# Patient Record
Sex: MALE | Race: WHITE | Employment: FULL TIME | URBAN - METROPOLITAN AREA
[De-identification: names, ages, dates, MRNs, and addresses within clinical notes are randomized per-mention and may not be internally consistent; named-entity substitution may affect disease eponyms.]

---

## 2018-05-19 ENCOUNTER — OFFICE VISIT (OUTPATIENT)
Dept: FAMILY MEDICINE CLINIC | Age: 33
End: 2018-05-19
Payer: COMMERCIAL

## 2018-05-19 VITALS
SYSTOLIC BLOOD PRESSURE: 142 MMHG | WEIGHT: 315 LBS | HEIGHT: 67 IN | HEART RATE: 79 BPM | OXYGEN SATURATION: 96 % | RESPIRATION RATE: 18 BRPM | DIASTOLIC BLOOD PRESSURE: 98 MMHG | BODY MASS INDEX: 49.44 KG/M2

## 2018-05-19 DIAGNOSIS — I10 ESSENTIAL HYPERTENSION: ICD-10-CM

## 2018-05-19 DIAGNOSIS — M77.01 MEDIAL EPICONDYLITIS OF RIGHT ELBOW: Primary | ICD-10-CM

## 2018-05-19 DIAGNOSIS — E78.00 PURE HYPERCHOLESTEROLEMIA: ICD-10-CM

## 2018-05-19 PROCEDURE — 99203 OFFICE O/P NEW LOW 30 MIN: CPT | Performed by: FAMILY MEDICINE

## 2018-05-19 RX ORDER — IBUPROFEN 800 MG/1
800 TABLET ORAL EVERY 6 HOURS PRN
Qty: 120 TABLET | Refills: 3 | Status: SHIPPED | OUTPATIENT
Start: 2018-05-19 | End: 2018-05-30 | Stop reason: ALTCHOICE

## 2018-05-19 RX ORDER — HYDROCHLOROTHIAZIDE 12.5 MG/1
12.5 TABLET ORAL DAILY
Qty: 90 TABLET | Refills: 1 | Status: SHIPPED | OUTPATIENT
Start: 2018-05-19 | End: 2018-06-16 | Stop reason: SDUPTHER

## 2018-05-19 ASSESSMENT — ENCOUNTER SYMPTOMS
ANAL BLEEDING: 0
SORE THROAT: 0
NAUSEA: 0
COUGH: 0
DIARRHEA: 0
BACK PAIN: 0
CONSTIPATION: 0
BLOOD IN STOOL: 0
EYE ITCHING: 0
ABDOMINAL PAIN: 0
COLOR CHANGE: 0
WHEEZING: 0
EYE PAIN: 0
SHORTNESS OF BREATH: 1
VOMITING: 0

## 2018-05-19 ASSESSMENT — PATIENT HEALTH QUESTIONNAIRE - PHQ9
SUM OF ALL RESPONSES TO PHQ QUESTIONS 1-9: 0
1. LITTLE INTEREST OR PLEASURE IN DOING THINGS: 0
2. FEELING DOWN, DEPRESSED OR HOPELESS: 0
SUM OF ALL RESPONSES TO PHQ9 QUESTIONS 1 & 2: 0

## 2018-05-23 ENCOUNTER — TELEPHONE (OUTPATIENT)
Dept: FAMILY MEDICINE CLINIC | Age: 33
End: 2018-05-23

## 2018-05-23 DIAGNOSIS — M77.01 GOLFERS ELBOW OF RIGHT UPPER EXTREMITY: Primary | ICD-10-CM

## 2018-05-29 ENCOUNTER — HOSPITAL ENCOUNTER (OUTPATIENT)
Age: 33
Discharge: HOME | End: 2018-05-31
Payer: COMMERCIAL

## 2018-05-29 DIAGNOSIS — E78.00 PURE HYPERCHOLESTEROLEMIA: ICD-10-CM

## 2018-05-29 DIAGNOSIS — M25.521 RIGHT ELBOW PAIN: Primary | ICD-10-CM

## 2018-05-29 DIAGNOSIS — I10 ESSENTIAL HYPERTENSION: ICD-10-CM

## 2018-05-29 LAB
ALBUMIN SERPL-MCNC: 3.9 G/DL (ref 3.5–5.2)
ALP BLD-CCNC: 67 U/L (ref 40–129)
ALT SERPL-CCNC: 23 U/L (ref 0–40)
ANION GAP SERPL CALCULATED.3IONS-SCNC: 14 MMOL/L (ref 7–16)
AST SERPL-CCNC: 21 U/L (ref 0–39)
BILIRUB SERPL-MCNC: 0.5 MG/DL (ref 0–1.2)
BUN BLDV-MCNC: 8 MG/DL (ref 6–20)
CALCIUM SERPL-MCNC: 9.1 MG/DL (ref 8.6–10.2)
CHLORIDE BLD-SCNC: 100 MMOL/L (ref 98–107)
CHOLESTEROL, TOTAL: 230 MG/DL (ref 0–199)
CO2: 25 MMOL/L (ref 22–29)
CREAT SERPL-MCNC: 0.7 MG/DL (ref 0.7–1.2)
GFR AFRICAN AMERICAN: >60
GFR NON-AFRICAN AMERICAN: >60 ML/MIN/1.73
GLUCOSE BLD-MCNC: 97 MG/DL (ref 74–109)
HCT VFR BLD CALC: 50.2 % (ref 37–54)
HDLC SERPL-MCNC: 41 MG/DL
HEMOGLOBIN: 15.3 G/DL (ref 12.5–16.5)
LDL CHOLESTEROL CALCULATED: 152 MG/DL (ref 0–99)
MCH RBC QN AUTO: 26 PG (ref 26–35)
MCHC RBC AUTO-ENTMCNC: 30.5 % (ref 32–34.5)
MCV RBC AUTO: 85.2 FL (ref 80–99.9)
PDW BLD-RTO: 13.8 FL (ref 11.5–15)
PLATELET # BLD: 327 E9/L (ref 130–450)
PMV BLD AUTO: 9.9 FL (ref 7–12)
POTASSIUM SERPL-SCNC: 4.3 MMOL/L (ref 3.5–5)
RBC # BLD: 5.89 E12/L (ref 3.8–5.8)
SODIUM BLD-SCNC: 139 MMOL/L (ref 132–146)
TOTAL PROTEIN: 7.3 G/DL (ref 6.4–8.3)
TRIGL SERPL-MCNC: 185 MG/DL (ref 0–149)
VLDLC SERPL CALC-MCNC: 37 MG/DL
WBC # BLD: 6.7 E9/L (ref 4.5–11.5)

## 2018-05-29 PROCEDURE — 36415 COLL VENOUS BLD VENIPUNCTURE: CPT

## 2018-05-29 PROCEDURE — 80053 COMPREHEN METABOLIC PANEL: CPT

## 2018-05-29 PROCEDURE — 80061 LIPID PANEL: CPT

## 2018-05-29 PROCEDURE — 85027 COMPLETE CBC AUTOMATED: CPT

## 2018-05-30 ENCOUNTER — OFFICE VISIT (OUTPATIENT)
Dept: ORTHOPEDIC SURGERY | Age: 33
End: 2018-05-30
Payer: COMMERCIAL

## 2018-05-30 VITALS — WEIGHT: 315 LBS | HEIGHT: 67 IN | BODY MASS INDEX: 49.44 KG/M2

## 2018-05-30 DIAGNOSIS — M77.01 MEDIAL EPICONDYLITIS OF RIGHT ELBOW: Primary | ICD-10-CM

## 2018-05-30 PROCEDURE — 99203 OFFICE O/P NEW LOW 30 MIN: CPT | Performed by: ORTHOPAEDIC SURGERY

## 2018-05-30 RX ORDER — MELOXICAM 15 MG/1
15 TABLET ORAL DAILY
Qty: 30 TABLET | Refills: 2 | Status: SHIPPED | OUTPATIENT
Start: 2018-05-30 | End: 2019-03-23

## 2018-06-16 ENCOUNTER — OFFICE VISIT (OUTPATIENT)
Dept: FAMILY MEDICINE CLINIC | Age: 33
End: 2018-06-16
Payer: COMMERCIAL

## 2018-06-16 VITALS
WEIGHT: 315 LBS | HEART RATE: 88 BPM | HEIGHT: 67 IN | BODY MASS INDEX: 49.44 KG/M2 | OXYGEN SATURATION: 95 % | DIASTOLIC BLOOD PRESSURE: 90 MMHG | RESPIRATION RATE: 18 BRPM | SYSTOLIC BLOOD PRESSURE: 140 MMHG

## 2018-06-16 DIAGNOSIS — I10 ESSENTIAL HYPERTENSION: ICD-10-CM

## 2018-06-16 DIAGNOSIS — R06.83 SNORING: Primary | ICD-10-CM

## 2018-06-16 DIAGNOSIS — Z23 NEED FOR TDAP VACCINATION: ICD-10-CM

## 2018-06-16 PROCEDURE — 90715 TDAP VACCINE 7 YRS/> IM: CPT | Performed by: FAMILY MEDICINE

## 2018-06-16 PROCEDURE — 99213 OFFICE O/P EST LOW 20 MIN: CPT | Performed by: FAMILY MEDICINE

## 2018-06-16 PROCEDURE — 90471 IMMUNIZATION ADMIN: CPT | Performed by: FAMILY MEDICINE

## 2018-06-16 RX ORDER — HYDROCHLOROTHIAZIDE 25 MG/1
25 TABLET ORAL DAILY
Qty: 30 TABLET | Refills: 3 | Status: SHIPPED | OUTPATIENT
Start: 2018-06-16 | End: 2018-11-10 | Stop reason: SDUPTHER

## 2018-06-16 ASSESSMENT — ENCOUNTER SYMPTOMS
WHEEZING: 0
VOMITING: 0
CONSTIPATION: 0
SHORTNESS OF BREATH: 0
COUGH: 0
ABDOMINAL PAIN: 0
DIARRHEA: 0
NAUSEA: 0

## 2018-07-16 ENCOUNTER — HOSPITAL ENCOUNTER (OUTPATIENT)
Age: 33
Discharge: HOME | End: 2018-07-18
Payer: COMMERCIAL

## 2018-07-16 DIAGNOSIS — I10 ESSENTIAL HYPERTENSION: ICD-10-CM

## 2018-07-16 PROCEDURE — 36415 COLL VENOUS BLD VENIPUNCTURE: CPT

## 2018-07-16 PROCEDURE — 80048 BASIC METABOLIC PNL TOTAL CA: CPT

## 2018-07-17 LAB
ANION GAP SERPL CALCULATED.3IONS-SCNC: 18 MMOL/L (ref 7–16)
BUN BLDV-MCNC: 12 MG/DL (ref 6–20)
CALCIUM SERPL-MCNC: 9.4 MG/DL (ref 8.6–10.2)
CHLORIDE BLD-SCNC: 99 MMOL/L (ref 98–107)
CO2: 24 MMOL/L (ref 22–29)
CREAT SERPL-MCNC: 0.6 MG/DL (ref 0.7–1.2)
GFR AFRICAN AMERICAN: >60
GFR NON-AFRICAN AMERICAN: >60 ML/MIN/1.73
GLUCOSE BLD-MCNC: 112 MG/DL (ref 74–109)
POTASSIUM SERPL-SCNC: 4 MMOL/L (ref 3.5–5)
SODIUM BLD-SCNC: 141 MMOL/L (ref 132–146)

## 2018-07-21 ENCOUNTER — OFFICE VISIT (OUTPATIENT)
Dept: FAMILY MEDICINE CLINIC | Age: 33
End: 2018-07-21
Payer: COMMERCIAL

## 2018-07-21 VITALS
OXYGEN SATURATION: 96 % | RESPIRATION RATE: 18 BRPM | HEART RATE: 89 BPM | DIASTOLIC BLOOD PRESSURE: 88 MMHG | BODY MASS INDEX: 49.44 KG/M2 | WEIGHT: 315 LBS | HEIGHT: 67 IN | SYSTOLIC BLOOD PRESSURE: 136 MMHG

## 2018-07-21 DIAGNOSIS — Z72.0 TOBACCO ABUSE: ICD-10-CM

## 2018-07-21 DIAGNOSIS — I10 ESSENTIAL HYPERTENSION: Primary | ICD-10-CM

## 2018-07-21 DIAGNOSIS — L91.8 SKIN TAGS, MULTIPLE ACQUIRED: ICD-10-CM

## 2018-07-21 PROCEDURE — 99213 OFFICE O/P EST LOW 20 MIN: CPT | Performed by: FAMILY MEDICINE

## 2018-07-21 RX ORDER — LISINOPRIL 10 MG/1
10 TABLET ORAL DAILY
Qty: 30 TABLET | Refills: 3 | Status: SHIPPED | OUTPATIENT
Start: 2018-07-21 | End: 2018-11-10 | Stop reason: SDUPTHER

## 2018-07-21 ASSESSMENT — PATIENT HEALTH QUESTIONNAIRE - PHQ9
2. FEELING DOWN, DEPRESSED OR HOPELESS: 0
SUM OF ALL RESPONSES TO PHQ9 QUESTIONS 1 & 2: 0
1. LITTLE INTEREST OR PLEASURE IN DOING THINGS: 0
SUM OF ALL RESPONSES TO PHQ QUESTIONS 1-9: 0

## 2018-07-21 NOTE — PROGRESS NOTES
[facility]   [phone]   Soraya Benjamin MD     Patient: Caprice Cohn  YOB: 1985  Visit Date: 7/21/18    Kayla Longo is a 27y.o. year old male here today for   Chief Complaint   Patient presents with    Hypertension     4 wk f/u     Skin Tag       HPI  Patient is a 27year old male here to follow up blood pressure. Is not having any issues with medicines. Denies any change in eating habits. Does not salt food and tries to avoid salty foods. Denies any chest pain, sob, headache, lightheadedness. Is still smoking. Would like help quitting. Review of Systems   Constitutional: Negative for chills and fever. Respiratory: Negative for cough, shortness of breath and wheezing. Cardiovascular: Negative for chest pain, palpitations and leg swelling. Gastrointestinal: Negative for abdominal pain, constipation, diarrhea, nausea and vomiting. Current Outpatient Prescriptions on File Prior to Visit   Medication Sig Dispense Refill    hydrochlorothiazide (HYDRODIURIL) 25 MG tablet Take 1 tablet by mouth daily 30 tablet 3    meloxicam (MOBIC) 15 MG tablet Take 1 tablet by mouth daily Take with food. 30 tablet 2    diclofenac sodium (VOLTAREN) 1 % GEL Apply 4 g topically 4 times daily 5 Tube 3     No current facility-administered medications on file prior to visit. No Known Allergies    Past medical, surgical, social and/or family history reviewed, updated as needed, and are non-contributory (unless otherwise stated). Medications, allergies, and problem list also reviewed and updated as needed in patient's record.     Wt Readings from Last 3 Encounters:   07/21/18 (!) 406 lb (184.2 kg)   06/16/18 (!) 404 lb (183.3 kg)   05/30/18 (!) 400 lb (181.4 kg)                   /88 (Site: Left Arm, Position: Sitting)   Pulse 89   Resp 18   Ht 5' 7\" (1.702 m)   Wt (!) 406 lb (184.2 kg)   SpO2 96%   BMI 63.59 kg/m² Physical Exam   Constitutional: He appears well-developed and well-nourished. HENT:   Head: Normocephalic and atraumatic. Cardiovascular: Normal rate, regular rhythm, normal heart sounds and intact distal pulses. No murmur heard. Pulmonary/Chest: Effort normal and breath sounds normal. No respiratory distress. He has no wheezes. He has no rales. Abdominal: Soft. Bowel sounds are normal. He exhibits no distension. There is no tenderness. There is no rebound and no guarding. Musculoskeletal: Normal range of motion. He exhibits no edema or tenderness. Nursing note and vitals reviewed. Results for orders placed or performed during the hospital encounter of 94/46/27   Basic Metabolic Panel   Result Value Ref Range    Sodium 141 132 - 146 mmol/L    Potassium 4.0 3.5 - 5.0 mmol/L    Chloride 99 98 - 107 mmol/L    CO2 24 22 - 29 mmol/L    Anion Gap 18 (H) 7 - 16 mmol/L    Glucose 112 (H) 74 - 109 mg/dL    BUN 12 6 - 20 mg/dL    CREATININE 0.6 (L) 0.7 - 1.2 mg/dL    GFR Non-African American >60 >=60 mL/min/1.73    GFR African American >60     Calcium 9.4 8.6 - 10.2 mg/dL       ASSESSMENT/PLAN  Donn Martínez was seen today for hypertension and skin tag. Diagnoses and all orders for this visit:    Essential hypertension  -     lisinopril (PRINIVIL;ZESTRIL) 10 MG tablet; Take 1 tablet by mouth daily  -     Basic Metabolic Panel; Future    Skin tags, multiple acquired  [facility] & [facility]- Sommer Morrow MD (AGNES)    Tobacco abuse  -     nicotine polacrilex (NICORETTE) 4 MG gum; Take 1 each by mouth as needed for Smoking cessation Max 24 pieces per day. Phone/MyChart follow up if tests abnormal.    Return in about 4 weeks (around 8/18/2018) for hypertension. or sooner if necessary. I have reviewed my findings and recommendations with Aletha Butler M.D

## 2018-07-21 NOTE — PATIENT INSTRUCTIONS
Come in for a bmp in 2 weeks   Start the lisinopril daily   Continue the HCTZ   Try to limit your salt intake as much as possible. The dermatologist will call to schedule a visit with you   Start the nicotine gum , you can change pieces every hour . Follow up in 4 weeks or sooner with concerns.

## 2018-07-24 PROBLEM — L91.8 SKIN TAGS, MULTIPLE ACQUIRED: Status: ACTIVE | Noted: 2018-07-24

## 2018-07-24 PROBLEM — Z72.0 TOBACCO ABUSE: Status: ACTIVE | Noted: 2018-07-24

## 2018-07-24 PROBLEM — I10 ESSENTIAL HYPERTENSION: Status: ACTIVE | Noted: 2018-07-24

## 2018-07-24 ASSESSMENT — ENCOUNTER SYMPTOMS
DIARRHEA: 0
WHEEZING: 0
ABDOMINAL PAIN: 0
COUGH: 0
CONSTIPATION: 0
SHORTNESS OF BREATH: 0
NAUSEA: 0
VOMITING: 0

## 2018-08-02 ENCOUNTER — HOSPITAL ENCOUNTER (OUTPATIENT)
Dept: SLEEP CENTER | Age: 33
Discharge: HOME | End: 2018-08-02
Payer: COMMERCIAL

## 2018-08-02 PROCEDURE — 95806 SLEEP STUDY UNATT&RESP EFFT: CPT

## 2018-08-13 NOTE — PROGRESS NOTES
[address] [place]                                SLEEP STUDY REPORT    PATIENT NAME: Nhung Oneal                 :        1985  MED REC NO:   [id number]                            ROOM:  ACCOUNT NO:   [de-identified]                           ADMIT DATE: 2018  PROVIDER:     Evin Salguero MD    DATE OF STUDY:  2018    APNEALINK STUDY    ATTENDING PHYSICIAN:  Dusty Herndon MD    Recording, 10 hours 18 minutes. Evaluation, 10 hours 6 minutes. Weight 400 pounds, BMI of 62. AHI of 19.5, RI of 20, ANGELA 19. CONCLUSION:  This test is consistent with moderate obstructive sleep apnea. With these findings and with the clinical presentation and morbid obesity,  I will strongly recommend the polysomnography with CPAP titration. In  addition, of course weight control is essential and avoidance of sedatives,  narcotics, hypnotics, or any other substance that could worsen the apnea is  recommended.         Sophie Mari MD    D: 08/10/2018 15:09:26       T: 2018 5:22:42     [email or web address]  Job#: [id number]     Doc#: [id number]    CC:

## 2018-08-18 ENCOUNTER — HOSPITAL ENCOUNTER (OUTPATIENT)
Age: 33
Discharge: HOME | End: 2018-08-20
Payer: COMMERCIAL

## 2018-08-18 ENCOUNTER — OFFICE VISIT (OUTPATIENT)
Dept: FAMILY MEDICINE CLINIC | Age: 33
End: 2018-08-18
Payer: COMMERCIAL

## 2018-08-18 VITALS
DIASTOLIC BLOOD PRESSURE: 82 MMHG | RESPIRATION RATE: 18 BRPM | BODY MASS INDEX: 49.44 KG/M2 | HEART RATE: 79 BPM | SYSTOLIC BLOOD PRESSURE: 124 MMHG | OXYGEN SATURATION: 98 % | WEIGHT: 315 LBS | HEIGHT: 67 IN

## 2018-08-18 DIAGNOSIS — I10 ESSENTIAL HYPERTENSION: ICD-10-CM

## 2018-08-18 DIAGNOSIS — G47.33 OSA (OBSTRUCTIVE SLEEP APNEA): ICD-10-CM

## 2018-08-18 DIAGNOSIS — I10 ESSENTIAL HYPERTENSION: Primary | ICD-10-CM

## 2018-08-18 DIAGNOSIS — Z72.0 TOBACCO ABUSE: ICD-10-CM

## 2018-08-18 LAB
ANION GAP SERPL CALCULATED.3IONS-SCNC: 11 MMOL/L (ref 7–16)
BUN BLDV-MCNC: 12 MG/DL (ref 6–20)
CALCIUM SERPL-MCNC: 9.4 MG/DL (ref 8.6–10.2)
CHLORIDE BLD-SCNC: 101 MMOL/L (ref 98–107)
CO2: 28 MMOL/L (ref 22–29)
CREAT SERPL-MCNC: 0.7 MG/DL (ref 0.7–1.2)
GFR AFRICAN AMERICAN: >60
GFR NON-AFRICAN AMERICAN: >60 ML/MIN/1.73
GLUCOSE BLD-MCNC: 94 MG/DL (ref 74–109)
POTASSIUM SERPL-SCNC: 4.5 MMOL/L (ref 3.5–5)
SODIUM BLD-SCNC: 140 MMOL/L (ref 132–146)

## 2018-08-18 PROCEDURE — 99214 OFFICE O/P EST MOD 30 MIN: CPT | Performed by: FAMILY MEDICINE

## 2018-08-18 PROCEDURE — 80048 BASIC METABOLIC PNL TOTAL CA: CPT

## 2018-08-18 PROCEDURE — 36415 COLL VENOUS BLD VENIPUNCTURE: CPT

## 2018-08-18 ASSESSMENT — ENCOUNTER SYMPTOMS
COUGH: 0
SHORTNESS OF BREATH: 0
ABDOMINAL PAIN: 0
VOMITING: 0
NAUSEA: 0
WHEEZING: 0

## 2018-08-18 ASSESSMENT — PATIENT HEALTH QUESTIONNAIRE - PHQ9
2. FEELING DOWN, DEPRESSED OR HOPELESS: 0
1. LITTLE INTEREST OR PLEASURE IN DOING THINGS: 0
SUM OF ALL RESPONSES TO PHQ9 QUESTIONS 1 & 2: 0
SUM OF ALL RESPONSES TO PHQ QUESTIONS 1-9: 0
SUM OF ALL RESPONSES TO PHQ QUESTIONS 1-9: 0

## 2018-11-10 ENCOUNTER — OFFICE VISIT (OUTPATIENT)
Dept: FAMILY MEDICINE CLINIC | Age: 33
End: 2018-11-10
Payer: COMMERCIAL

## 2018-11-10 VITALS
BODY MASS INDEX: 49.44 KG/M2 | HEART RATE: 87 BPM | SYSTOLIC BLOOD PRESSURE: 134 MMHG | RESPIRATION RATE: 18 BRPM | HEIGHT: 67 IN | OXYGEN SATURATION: 95 % | WEIGHT: 315 LBS | DIASTOLIC BLOOD PRESSURE: 86 MMHG

## 2018-11-10 DIAGNOSIS — E66.01 CLASS 3 SEVERE OBESITY DUE TO EXCESS CALORIES WITH SERIOUS COMORBIDITY AND BODY MASS INDEX (BMI) OF 60.0 TO 69.9 IN ADULT (HCC): Primary | ICD-10-CM

## 2018-11-10 DIAGNOSIS — G47.33 OSA (OBSTRUCTIVE SLEEP APNEA): ICD-10-CM

## 2018-11-10 DIAGNOSIS — I10 ESSENTIAL HYPERTENSION: ICD-10-CM

## 2018-11-10 PROCEDURE — 99213 OFFICE O/P EST LOW 20 MIN: CPT | Performed by: FAMILY MEDICINE

## 2018-11-10 RX ORDER — LISINOPRIL 10 MG/1
10 TABLET ORAL DAILY
Qty: 30 TABLET | Refills: 3 | Status: SHIPPED | OUTPATIENT
Start: 2018-11-10 | End: 2019-03-23 | Stop reason: SDUPTHER

## 2018-11-10 RX ORDER — HYDROCHLOROTHIAZIDE 25 MG/1
25 TABLET ORAL DAILY
Qty: 30 TABLET | Refills: 3 | Status: SHIPPED | OUTPATIENT
Start: 2018-11-10 | End: 2019-03-23 | Stop reason: SDUPTHER

## 2018-11-10 ASSESSMENT — PATIENT HEALTH QUESTIONNAIRE - PHQ9
SUM OF ALL RESPONSES TO PHQ9 QUESTIONS 1 & 2: 0
1. LITTLE INTEREST OR PLEASURE IN DOING THINGS: 0
2. FEELING DOWN, DEPRESSED OR HOPELESS: 0
SUM OF ALL RESPONSES TO PHQ QUESTIONS 1-9: 0
SUM OF ALL RESPONSES TO PHQ QUESTIONS 1-9: 0

## 2018-11-10 ASSESSMENT — ENCOUNTER SYMPTOMS
SHORTNESS OF BREATH: 0
WHEEZING: 0
ABDOMINAL PAIN: 0
VOMITING: 0
COUGH: 0
NAUSEA: 0

## 2018-11-10 NOTE — PROGRESS NOTES
[address] [facility]  [phone]   Yadira Vizcarra MD     Patient: Hugh Conti  YOB: 1985  Visit Date: 11/10/18    Sarah Sheehan is a 26y.o. year old male here today for   Chief Complaint   Patient presents with    Hypertension     3 mo f/u    [facility] Maintenance     declines flu shot        HPI  Patient is a 26year old male here to discuss hypertension and sleep apnea. Used the CPAP machine for two weeks. Could not keep it on all night. Was too uncomfortable. Was not helping him sleep. Wants to lose weight. Does not want to use CPAP machine   Has been taking meds. Needs refill. No side effets from medicine. Has cut down chewing tobacco. Improving. Used to 3-4 cans a day. Now doing max 1 a day. On and off chews the gum. Does not like the taste. Review of Systems   Constitutional: Negative for chills and fever. Respiratory: Negative for cough, shortness of breath and wheezing. Cardiovascular: Negative for chest pain, palpitations and leg swelling. Gastrointestinal: Negative for abdominal pain, nausea and vomiting. Current Outpatient Prescriptions on File Prior to Visit   Medication Sig Dispense Refill    nicotine polacrilex (NICORETTE) 4 MG gum Take 1 each by mouth as needed for Smoking cessation Max 24 pieces per day. 220 each 3    meloxicam (MOBIC) 15 MG tablet Take 1 tablet by mouth daily Take with food. 30 tablet 2     No current facility-administered medications on file prior to visit. No Known Allergies    Past medical, surgical, socialand/or family history reviewed, updated as needed, and are non-contributory (unless otherwise stated). Medications, allergies, and problem list also reviewed and updated as needed in patient's record.     Wt Readings from Last 3 Encounters:   11/10/18 (!) 401 lb (181.9 kg)   08/18/18 (!) 404 lb (183.3 kg)   07/21/18 (!) 406 lb (184.2 kg)                   /86 (Site: Left Upper Arm, Position: Sitting)   Pulse 87   Resp 18   Ht 5' 7\" (1.702 m)   Wt (!) 401 lb (181.9 kg)   SpO2 95%   BMI 62.81 kg/m²       Physical Exam   Constitutional: He appears well-developed and well-nourished. No distress. Obesity   HENT:   Head: Normocephalic and atraumatic. Cardiovascular: Normal rate, regular rhythm, normal heart sounds and intact distal pulses. No murmur heard. Pulmonary/Chest: Breath sounds normal. No respiratory distress. He has no wheezes. He has no rales. Abdominal: Soft. Bowel sounds are normal. He exhibits no distension. There is no tenderness. There is no rebound and no guarding. Musculoskeletal: Normal range of motion. He exhibits no edema or tenderness. Skin: Skin is warm and dry. He is not diaphoretic. Psychiatric: He has a normal mood and affect. His behavior is normal.   Nursing note and vitals reviewed. Results for orders placed or performed during the hospital encounter of 20/43/41   Basic Metabolic Panel   Result Value Ref Range    Sodium 140 132 - 146 mmol/L    Potassium 4.5 3.5 - 5.0 mmol/L    Chloride 101 98 - 107 mmol/L    CO2 28 22 - 29 mmol/L    Anion Gap 11 7 - 16 mmol/L    Glucose 94 74 - 109 mg/dL    BUN 12 6 - 20 mg/dL    CREATININE 0.7 0.7 - 1.2 mg/dL    GFR Non-African American >60 >=60 mL/min/1.73    GFR African American >60     Calcium 9.4 8.6 - 10.2 mg/dL       ASSESSMENT/PLAN  Brionna Verma was seen today for hypertension and health maintenance. Diagnoses and all orders for this visit:    Class 3 severe obesity due to excess calories with serious comorbidity and body mass index (BMI) of 60.0 to 69.9 in adult [facility])  -     [facility]- [facility] Weight Loss Julio C Zavala MD    Essential hypertension  -     lisinopril (PRINIVIL;ZESTRIL) 10 MG tablet; Take 1 tablet by mouth daily  -     hydrochlorothiazide (HYDRODIURIL) 25 MG tablet;  Take 1 tablet by mouth daily    MADDIE (obstructive sleep apnea)  -     [facility] [place]

## 2019-02-09 ENCOUNTER — TELEPHONE (OUTPATIENT)
Dept: FAMILY MEDICINE CLINIC | Age: 34
End: 2019-02-09

## 2019-02-09 RX ORDER — CYCLOBENZAPRINE HCL 10 MG
10 TABLET ORAL 3 TIMES DAILY PRN
Qty: 30 TABLET | Refills: 0 | Status: SHIPPED | OUTPATIENT
Start: 2019-02-09 | End: 2019-02-19

## 2019-03-23 ENCOUNTER — OFFICE VISIT (OUTPATIENT)
Dept: FAMILY MEDICINE CLINIC | Age: 34
End: 2019-03-23
Payer: COMMERCIAL

## 2019-03-23 VITALS
RESPIRATION RATE: 18 BRPM | WEIGHT: 315 LBS | SYSTOLIC BLOOD PRESSURE: 124 MMHG | HEIGHT: 67 IN | BODY MASS INDEX: 49.44 KG/M2 | OXYGEN SATURATION: 94 % | DIASTOLIC BLOOD PRESSURE: 86 MMHG | HEART RATE: 108 BPM

## 2019-03-23 DIAGNOSIS — I10 ESSENTIAL HYPERTENSION: ICD-10-CM

## 2019-03-23 DIAGNOSIS — E78.49 OTHER HYPERLIPIDEMIA: Primary | ICD-10-CM

## 2019-03-23 DIAGNOSIS — S46.912A STRAIN OF LEFT SHOULDER, INITIAL ENCOUNTER: ICD-10-CM

## 2019-03-23 PROCEDURE — 99213 OFFICE O/P EST LOW 20 MIN: CPT | Performed by: FAMILY MEDICINE

## 2019-03-23 RX ORDER — LISINOPRIL 10 MG/1
10 TABLET ORAL DAILY
Qty: 90 TABLET | Refills: 3 | Status: SHIPPED | OUTPATIENT
Start: 2019-03-23 | End: 2019-07-20

## 2019-03-23 RX ORDER — IBUPROFEN 800 MG/1
800 TABLET ORAL
Qty: 90 TABLET | Refills: 5 | Status: SHIPPED
Start: 2019-03-23 | End: 2020-09-11 | Stop reason: SDUPTHER

## 2019-03-23 RX ORDER — CYCLOBENZAPRINE HCL 10 MG
10 TABLET ORAL 3 TIMES DAILY PRN
Qty: 30 TABLET | Refills: 0 | Status: SHIPPED | OUTPATIENT
Start: 2019-03-23 | End: 2019-04-02

## 2019-03-23 RX ORDER — HYDROCHLOROTHIAZIDE 25 MG/1
25 TABLET ORAL DAILY
Qty: 90 TABLET | Refills: 3 | Status: SHIPPED
Start: 2019-03-23 | End: 2020-04-03

## 2019-03-23 ASSESSMENT — ENCOUNTER SYMPTOMS
NAUSEA: 0
WHEEZING: 0
ABDOMINAL PAIN: 0
COUGH: 0
SHORTNESS OF BREATH: 0
VOMITING: 0

## 2019-03-23 NOTE — PROGRESS NOTES
[address] [facility]  [phone]   Ann Marie Shahid MD     Patient: Danny Nayak  YOB: 1985  Visit Date: 3/23/19    [facility] is a 26y.o. year old male here today for   Chief Complaint   Patient presents with    Hypertension     3 mo f/u     Shoulder Pain       HPI  Patient is a 26year old male here for follow up of hypertension and shoulder pain. No issues with medicines. Reports compliance. No symptoms. Blood pressure has been good. Denies chest pain , sob, lower extremity swelling. Chewing tobacco 1 a day which is an overall improvement. Does not like nicotine gum. Shoulder pain - left . Moving a couch a couple weeks ago . In the past had torn rotato cuff , did not require surgery. Lifting behind head hurts. Can do it just hurts. Hurts on back side of shoulder. Has some weakness due to pain . Has trouble picking up his son. Occasional numbness or tingling but not persistent. Has taken ibuprofen for this. Had tired cyclobenzaprine. sometimes helps with the pain. Review of Systems   Constitutional: Negative for chills and fever. Respiratory: Negative for cough, shortness of breath and wheezing. Cardiovascular: Negative for chest pain, palpitations and leg swelling. Gastrointestinal: Negative for abdominal pain, nausea and vomiting. No current outpatient medications on file prior to visit. No current facility-administered medications on file prior to visit. No Known Allergies    Past medical, surgical, socialand/or family history reviewed, updated as needed, and are non-contributory (unless otherwise stated). Medications, allergies, and problem list also reviewed and updated as needed in patient's record.     Wt Readings from Last 3 Encounters:   03/23/19 (!) 411 lb (186.4 kg)   11/10/18 (!) 401 lb (181.9 kg)   08/18/18 (!) 404 lb (183.3 kg)                   /86 (Site: Right Upper Arm, Position: Sitting)   Pulse 108   Resp 18   Ht 5' 7\" (1.702 m)   Wt (!) 411 lb (186.4 kg)   SpO2 94%   BMI 64.37 kg/m²        Physical Exam   Constitutional: He appears well-developed and well-nourished. No distress. Obesity   HENT:   Head: Normocephalic and atraumatic. Cardiovascular: Normal rate, regular rhythm, normal heart sounds and intact distal pulses. No murmur heard. Pulmonary/Chest: Breath sounds normal. No respiratory distress. He has no wheezes. He has no rales. Abdominal: Soft. Bowel sounds are normal. He exhibits no distension. There is no tenderness. There is no rebound and no guarding. Musculoskeletal: Normal range of motion. He exhibits no edema or tenderness. Negative empty can test bilaterally TTP over posterior shoulder on left. Full ROM . Strength 5/5   Skin: Skin is warm and dry. He is not diaphoretic. Psychiatric: He has a normal mood and affect. His behavior is normal.   Nursing note and vitals reviewed. Results for orders placed or performed during the hospital encounter of 67/07/86   Basic Metabolic Panel   Result Value Ref Range    Sodium 140 132 - 146 mmol/L    Potassium 4.5 3.5 - 5.0 mmol/L    Chloride 101 98 - 107 mmol/L    CO2 28 22 - 29 mmol/L    Anion Gap 11 7 - 16 mmol/L    Glucose 94 74 - 109 mg/dL    BUN 12 6 - 20 mg/dL    CREATININE 0.7 0.7 - 1.2 mg/dL    GFR Non-African American >60 >=60 mL/min/1.73    GFR African American >60     Calcium 9.4 8.6 - 10.2 mg/dL       ASSESSMENT/PLAN  Sahra Moore was seen today for hypertension and shoulder pain. Diagnoses and all orders for this visit:    Other hyperlipidemia  -     Comprehensive Metabolic Panel; Future  -     Lipid Panel; Future    Essential hypertension  -     hydrochlorothiazide (HYDRODIURIL) 25 MG tablet; Take 1 tablet by mouth daily  -     lisinopril (PRINIVIL;ZESTRIL) 10 MG tablet; Take 1 tablet by mouth daily  -     CBC;  Future    Strain of left shoulder, initial encounter  -     ibuprofen (ADVIL;MOTRIN) 800 MG tablet; Take 1 tablet by mouth 3 times daily (with meals)  -     cyclobenzaprine (FLEXERIL) 10 MG tablet; Take 1 tablet by mouth 3 times daily as needed for Muscle spasms  - Given stretches to do at home. If does not improve , will refer to ortho for steroid injection             Phone/MyChart follow up if tests abnormal.    Return in about 3 months (around 6/23/2019) for hypertension. or sooner if necessary. I have reviewed myfindings and recommendations with Sofy Myers M.D

## 2019-03-23 NOTE — PATIENT INSTRUCTIONS
Take the ibuprofen 800mg every 8 hours for two weeks   Take the flexeril every 8 hours as needed   Do the stretches 1-2 times a day   If no improvement in 3 weeks call me and I will give you a referral to the ortho doctor for a possible injection  Follow up in 3 months or sooner as needed. Patient Education        Shoulder Stretches: Exercises  Your Care Instructions  Here are some examples of exercises for your shoulder. Start each exercise slowly. Ease off the exercise if you start to have pain. Your doctor or physical therapist will tell you when you can start these exercises and which ones will work best for you. How to do the exercises  Shoulder stretch    1.  a doorway and place one arm against the door frame. Your elbow should be a little higher than your shoulder. 2. Relax your shoulders as you lean forward, allowing your chest and shoulder muscles to stretch. You can also turn your body slightly away from your arm to stretch the muscles even more. 3. Hold for 15 to 30 seconds. 4. Repeat 2 to 4 times with each arm. Shoulder and chest stretch    1. Shoulder and chest stretch  2. While sitting, relax your upper body so you slump slightly in your chair. 3. As you breathe in, straighten your back and open your arms out to the sides. 4. Gently pull your shoulder blades back and downward. 5. Hold for 15 to 30 seconds as your breathe normally. 6. Repeat 2 to 4 times. Overhead stretch    1. Reach up over your head with both arms. 2. Hold for 15 to 30 seconds. 3. Repeat 2 to 4 times. Follow-up care is a key part of your treatment and safety. Be sure to make and go to all appointments, and call your doctor if you are having problems. It's also a good idea to know your test results and keep a list of the medicines you take. Where can you learn more? Go to https://nora.tokia.lt. org and sign in to your [employer] account.  Enter S254 in the [facility] box to learn more about \"Shoulder Stretches: Exercises. \"     If you do not have an account, please click on the \"Sign Up Now\" link. Current as of: September 20, 2018  Content Version: 11.9  © 1740-3529 [employer]. Care instructions adapted under license by [facility] ([facility]). If you have questions about a medical condition or this instruction, always ask your healthcare professional. [address] any warranty or liability for your use of this information.

## 2019-04-16 ENCOUNTER — TELEPHONE (OUTPATIENT)
Dept: BARIATRICS/WEIGHT MGMT | Age: 34
End: 2019-04-16

## 2019-04-16 NOTE — TELEPHONE ENCOUNTER
I called pt on 4-15 to confirm that he wanted to cancel via phytel. I called pt and lm to call our office back to confirm. I tried calling 4-16 to confirm to cancel. I then tried to call wife and lm to call out office back to cancel the appt. I then tried to call the pt at work. I was transferred twice to a lady in . The lady stated our employees are not aloud to take personal calls at work. I tried to state that I just need to know if he needs to cancel his appt. They HR lady stated they are not aloud personal calls. I stated thank you and ended the call.  I then called pt to cancel the appt and he would have to call back to reschedule

## 2019-06-28 ENCOUNTER — HOSPITAL ENCOUNTER (EMERGENCY)
Age: 34
Discharge: HOME | End: 2019-06-28
Payer: COMMERCIAL

## 2019-06-28 VITALS
OXYGEN SATURATION: 96 % | TEMPERATURE: 98.1 F | RESPIRATION RATE: 16 BRPM | HEART RATE: 86 BPM | HEIGHT: 67 IN | WEIGHT: 315 LBS | SYSTOLIC BLOOD PRESSURE: 170 MMHG | DIASTOLIC BLOOD PRESSURE: 79 MMHG | BODY MASS INDEX: 49.44 KG/M2

## 2019-06-28 DIAGNOSIS — L25.9 CONTACT DERMATITIS, UNSPECIFIED CONTACT DERMATITIS TYPE, UNSPECIFIED TRIGGER: Primary | ICD-10-CM

## 2019-06-28 PROCEDURE — 99282 EMERGENCY DEPT VISIT SF MDM: CPT

## 2019-06-28 RX ORDER — PREDNISONE 20 MG/1
TABLET ORAL
Qty: 18 TABLET | Refills: 0 | Status: SHIPPED | OUTPATIENT
Start: 2019-06-28 | End: 2019-07-08

## 2019-06-28 RX ORDER — HYDROXYZINE PAMOATE 50 MG/1
50 CAPSULE ORAL 3 TIMES DAILY PRN
Qty: 21 CAPSULE | Refills: 0 | Status: SHIPPED | OUTPATIENT
Start: 2019-06-28 | End: 2019-07-05

## 2019-06-28 NOTE — ED PROVIDER NOTES
[facility]     Department of Emergency Medicine   ED  Provider Note  Admit Date/RoomTime: 6/28/2019  1:37 PM  ED Room: MALIKA/MALIKA  Chief Complaint   Wound Check (left arm- thinks something may have bit him, began tuesday, swelling and redness, getting worse, oozing )    History of Present Illness   Source of history provided by:  patient. History/Exam Limitations: none. Rui Arevalo is a 26 y.o. old male with has a past medical history of:   Past Medical History:   Diagnosis Date    Hyperlipidemia     Hypertension     presents to the emergency department by private vehicle, for complaint of rash to the left forearm. Patient states that he noticed it 3 days ago. He states that there was an area of redness and swelling that was very itchy. He states that since then it has become increasingly itchy. He states that there is been a clear fluid that has been coming out of the blisters that formed. He denies any pain to the area. No fevers or chills. He did not fall or injure his arm. He states that he has not seen any insect bite his arm. He denies any new soaps, lotions, detergents or medications to the area. No exposure to poison ivy. He has not tried anything for his symptoms. Nothing seems to make it better or worse. ROS    Pertinent positives and negatives are stated within HPI, all other systems reviewed and are negative. Past Surgical History:   Procedure Laterality Date    FRACTURE SURGERY      left knee   Social History:  reports that he quit smoking about 9 years ago. His smoking use included cigarettes. He has a 5.00 pack-year smoking history. His smokeless tobacco use includes chew. He reports that he drinks alcohol. He reports that he does not use drugs.   Family History: family history includes Atrial Fibrillation in his maternal grandfather; Cancer in his paternal grandmother; Cancer (age of onset: 46) in his father; Coronary Art Dis in his father; Diabetes in his father, formation. Patient has no pain, no sign of DVT. He is a good peripheral pulse. No purulent drainage from the area. I think that this likely is a contact dermatitis. Will place on Vistaril and steroid cream.  Patient is advised to return to the ER for any worsening symptoms. Otherwise patient is to follow-up with PCP. Counseling: The emergency provider has spoken with the patient and discussed todays results, in addition to providing specific details for the plan of care and counseling regarding the diagnosis and prognosis. Questions are answered at this time and they are agreeable with the plan. Assessment      1. Contact dermatitis, unspecified contact dermatitis type, unspecified trigger      Plan   Discharge to home  Patient condition is good    New Medications     Discharge Medication List as of 6/28/2019  2:08 PM      START taking these medications    Details   hydrOXYzine (VISTARIL) 50 MG capsule Take 1 capsule by mouth 3 times daily as needed for Itching, Disp-21 capsule, R-0Print      hydrocortisone 2.5 % cream Apply topically 2 times daily. , Disp-20 g, R-0, Print      predniSONE (DELTASONE) 20 MG tablet Sig.: Take 60mg  Po qd x 3 days, then 40mg po qd x3 days, then 20mg po qd x 3 days. QS x 9 days, Disp-18 tablet, R-0Print           Electronically signed by TRACY Brewer   DD: 6/28/19  **This report was transcribed using voice recognition software. Every effort was made to ensure accuracy; however, inadvertent computerized transcription errors may be present.   END OF ED PROVIDER NOTE       Rachel Brewer  06/28/19 1554

## 2019-07-20 ENCOUNTER — OFFICE VISIT (OUTPATIENT)
Dept: FAMILY MEDICINE CLINIC | Age: 34
End: 2019-07-20
Payer: COMMERCIAL

## 2019-07-20 ENCOUNTER — HOSPITAL ENCOUNTER (OUTPATIENT)
Age: 34
Discharge: HOME | End: 2019-07-22
Payer: COMMERCIAL

## 2019-07-20 VITALS
WEIGHT: 315 LBS | HEIGHT: 67 IN | HEART RATE: 79 BPM | DIASTOLIC BLOOD PRESSURE: 90 MMHG | BODY MASS INDEX: 49.44 KG/M2 | OXYGEN SATURATION: 97 % | RESPIRATION RATE: 18 BRPM | SYSTOLIC BLOOD PRESSURE: 134 MMHG

## 2019-07-20 DIAGNOSIS — I10 ESSENTIAL HYPERTENSION: Primary | ICD-10-CM

## 2019-07-20 DIAGNOSIS — E78.49 OTHER HYPERLIPIDEMIA: ICD-10-CM

## 2019-07-20 DIAGNOSIS — I10 ESSENTIAL HYPERTENSION: ICD-10-CM

## 2019-07-20 LAB
ALBUMIN SERPL-MCNC: 4.3 G/DL (ref 3.5–5.2)
ALP BLD-CCNC: 59 U/L (ref 40–129)
ALT SERPL-CCNC: 31 U/L (ref 0–40)
ANION GAP SERPL CALCULATED.3IONS-SCNC: 16 MMOL/L (ref 7–16)
AST SERPL-CCNC: 25 U/L (ref 0–39)
BILIRUB SERPL-MCNC: 0.4 MG/DL (ref 0–1.2)
BUN BLDV-MCNC: 10 MG/DL (ref 6–20)
CALCIUM SERPL-MCNC: 9.3 MG/DL (ref 8.6–10.2)
CHLORIDE BLD-SCNC: 102 MMOL/L (ref 98–107)
CHOLESTEROL, TOTAL: 264 MG/DL (ref 0–199)
CO2: 24 MMOL/L (ref 22–29)
CREAT SERPL-MCNC: 0.8 MG/DL (ref 0.7–1.2)
GFR AFRICAN AMERICAN: >60
GFR NON-AFRICAN AMERICAN: >60 ML/MIN/1.73
GLUCOSE BLD-MCNC: 103 MG/DL (ref 74–99)
HCT VFR BLD CALC: 47.6 % (ref 37–54)
HDLC SERPL-MCNC: 47 MG/DL
HEMOGLOBIN: 15.1 G/DL (ref 12.5–16.5)
LDL CHOLESTEROL CALCULATED: 189 MG/DL (ref 0–99)
MCH RBC QN AUTO: 26.8 PG (ref 26–35)
MCHC RBC AUTO-ENTMCNC: 31.7 % (ref 32–34.5)
MCV RBC AUTO: 84.4 FL (ref 80–99.9)
PDW BLD-RTO: 13.8 FL (ref 11.5–15)
PLATELET # BLD: 318 E9/L (ref 130–450)
PMV BLD AUTO: 9.9 FL (ref 7–12)
POTASSIUM SERPL-SCNC: 4.4 MMOL/L (ref 3.5–5)
RBC # BLD: 5.64 E12/L (ref 3.8–5.8)
SODIUM BLD-SCNC: 142 MMOL/L (ref 132–146)
TOTAL PROTEIN: 7.3 G/DL (ref 6.4–8.3)
TRIGL SERPL-MCNC: 142 MG/DL (ref 0–149)
VLDLC SERPL CALC-MCNC: 28 MG/DL
WBC # BLD: 5.9 E9/L (ref 4.5–11.5)

## 2019-07-20 PROCEDURE — 85027 COMPLETE CBC AUTOMATED: CPT

## 2019-07-20 PROCEDURE — 36415 COLL VENOUS BLD VENIPUNCTURE: CPT

## 2019-07-20 PROCEDURE — 80061 LIPID PANEL: CPT

## 2019-07-20 PROCEDURE — 80053 COMPREHEN METABOLIC PANEL: CPT

## 2019-07-20 PROCEDURE — 99213 OFFICE O/P EST LOW 20 MIN: CPT | Performed by: FAMILY MEDICINE

## 2019-07-20 RX ORDER — LISINOPRIL 20 MG/1
20 TABLET ORAL DAILY
Qty: 30 TABLET | Refills: 5 | Status: SHIPPED
Start: 2019-07-20 | End: 2020-06-22 | Stop reason: SDUPTHER

## 2019-07-20 ASSESSMENT — PATIENT HEALTH QUESTIONNAIRE - PHQ9
1. LITTLE INTEREST OR PLEASURE IN DOING THINGS: 0
SUM OF ALL RESPONSES TO PHQ9 QUESTIONS 1 & 2: 0
SUM OF ALL RESPONSES TO PHQ QUESTIONS 1-9: 0
SUM OF ALL RESPONSES TO PHQ QUESTIONS 1-9: 0
2. FEELING DOWN, DEPRESSED OR HOPELESS: 0

## 2019-07-20 ASSESSMENT — ENCOUNTER SYMPTOMS
SHORTNESS OF BREATH: 0
COUGH: 0
VOMITING: 0
WHEEZING: 0
NAUSEA: 0

## 2019-07-20 NOTE — PROGRESS NOTES
[facility]  [phone]   Shelly Black MD     Patient: Nedra Guerrero  YOB: 1985  Visit Date: 7/20/19    Lino Perla is a 26y.o. year old male here today for   Chief Complaint   Patient presents with    Hyperlipidemia     3 mo f/u        HPI  Patient is a 26year old male here for hyperlipidemia and hypertension. Denies any chest pain or shortness of breath, dizziness or lightheadedness, nausea or vomiting. Has not yet had blood work done. Review of Systems   Constitutional: Negative for chills and fever. Respiratory: Negative for cough, shortness of breath and wheezing. Cardiovascular: Negative for chest pain, palpitations and leg swelling. Gastrointestinal: Negative for nausea and vomiting. Current Outpatient Medications on File Prior to Visit   Medication Sig Dispense Refill    hydrocortisone 2.5 % cream Apply topically 2 times daily. 20 g 0    hydrochlorothiazide (HYDRODIURIL) 25 MG tablet Take 1 tablet by mouth daily 90 tablet 3    ibuprofen (ADVIL;MOTRIN) 800 MG tablet Take 1 tablet by mouth 3 times daily (with meals) 90 tablet 5     No current facility-administered medications on file prior to visit. No Known Allergies    Past medical, surgical, socialand/or family history reviewed, updated as needed, and are non-contributory (unless otherwise stated). Medications, allergies, and problem list also reviewed and updated as needed in patient's record. Wt Readings from Last 3 Encounters:   07/20/19 (!) 415 lb (188.2 kg)   06/28/19 (!) 400 lb (181.4 kg)   03/23/19 (!) 411 lb (186.4 kg)                   BP (!) 134/90 (Site: Right Upper Arm, Position: Sitting)   Pulse 79   Resp 18   Ht 5' 7\" (1.702 m)   Wt (!) 415 lb (188.2 kg)   SpO2 97%   BMI 65.00 kg/m²        Physical Exam   Constitutional: He appears well-developed and well-nourished. No distress.    Obesity   HENT:   Head: Normocephalic and atraumatic. Cardiovascular: Normal rate, regular rhythm, normal heart sounds and intact distal pulses. No murmur heard. Pulmonary/Chest: Effort normal and breath sounds normal. No stridor. No respiratory distress. He has no wheezes. He has no rales. Abdominal: Soft. Bowel sounds are normal. He exhibits no distension. There is no tenderness. There is no rebound and no guarding. Musculoskeletal: Normal range of motion. He exhibits no edema or tenderness. Skin: Skin is warm and dry. He is not diaphoretic. Psychiatric: He has a normal mood and affect. His behavior is normal.   Nursing note and vitals reviewed. Results for orders placed or performed during the hospital encounter of 59/97/55   Basic Metabolic Panel   Result Value Ref Range    Sodium 140 132 - 146 mmol/L    Potassium 4.5 3.5 - 5.0 mmol/L    Chloride 101 98 - 107 mmol/L    CO2 28 22 - 29 mmol/L    Anion Gap 11 7 - 16 mmol/L    Glucose 94 74 - 109 mg/dL    BUN 12 6 - 20 mg/dL    CREATININE 0.7 0.7 - 1.2 mg/dL    GFR Non-African American >60 >=60 mL/min/1.73    GFR African American >60     Calcium 9.4 8.6 - 10.2 mg/dL       ASSESSMENT/PLAN  Lydia Wiseman was seen today for hyperlipidemia. Diagnoses and all orders for this visit:    Essential hypertension  -     lisinopril (PRINIVIL;ZESTRIL) 20 MG tablet; Take 1 tablet by mouth daily  -  will increase lisinopril to 20mg   Blood work today             Phone/MyChart follow up if tests abnormal.    Return in about 3 months (around 10/20/2019) for hypertension . or sooner if necessary. I have reviewed myfindings and recommendations with Andre Catrachita Deras M.D

## 2019-07-20 NOTE — PATIENT INSTRUCTIONS
Increase lisinopril to 20mg   Get blood work done today   Follow up in 3 months or sooner as needed.

## 2019-07-26 DIAGNOSIS — E78.49 OTHER HYPERLIPIDEMIA: Primary | ICD-10-CM

## 2019-07-26 RX ORDER — ATORVASTATIN CALCIUM 10 MG/1
10 TABLET, FILM COATED ORAL DAILY
Qty: 30 TABLET | Refills: 5 | Status: SHIPPED
Start: 2019-07-26 | End: 2020-06-22 | Stop reason: SDUPTHER

## 2020-01-29 ENCOUNTER — HOSPITAL ENCOUNTER (EMERGENCY)
Age: 35
Discharge: HOME | End: 2020-01-29
Payer: COMMERCIAL

## 2020-01-29 VITALS
DIASTOLIC BLOOD PRESSURE: 80 MMHG | SYSTOLIC BLOOD PRESSURE: 132 MMHG | OXYGEN SATURATION: 96 % | TEMPERATURE: 98.7 F | HEART RATE: 109 BPM | WEIGHT: 315 LBS | RESPIRATION RATE: 16 BRPM | BODY MASS INDEX: 62.65 KG/M2

## 2020-01-29 PROCEDURE — 99212 OFFICE O/P EST SF 10 MIN: CPT

## 2020-01-29 RX ORDER — OSELTAMIVIR PHOSPHATE 75 MG/1
75 CAPSULE ORAL 2 TIMES DAILY
Qty: 10 CAPSULE | Refills: 0 | Status: SHIPPED | OUTPATIENT
Start: 2020-01-29 | End: 2020-02-03

## 2020-01-29 NOTE — ED PROVIDER NOTES
This is a 43-year-old male who presents to urgent care complaining of cough congestion fatigue body aches URI symptoms. States he felt very tired. States his son was recently diagnosed with influenza. He thinks he has it. Denies abdominal pain nausea vomiting diarrhea or urinary symptoms. Review of Systems   Constitutional:        Pertinent positives and negatives are stated within HPI, all other systems reviewed and are negative. Physical Exam  Vitals signs and nursing note reviewed. Constitutional:       Appearance: He is well-developed. HENT:      Head: Normocephalic and atraumatic. Jaw: No trismus. Right Ear: Hearing, ear canal and external ear normal. Tympanic membrane is bulging. Tympanic membrane is not perforated, erythematous or retracted. Left Ear: Hearing, ear canal and external ear normal. Tympanic membrane is bulging. Tympanic membrane is not perforated, erythematous or retracted. Nose: Nose normal.      Right Sinus: No maxillary sinus tenderness or frontal sinus tenderness. Left Sinus: No maxillary sinus tenderness or frontal sinus tenderness. Mouth/Throat:      Pharynx: Uvula midline. No uvula swelling. Eyes:      General: Lids are normal.      Conjunctiva/sclera: Conjunctivae normal.      Pupils: Pupils are equal, round, and reactive to light. Neck:      Musculoskeletal: Full passive range of motion without pain, normal range of motion and neck supple. Cardiovascular:      Rate and Rhythm: Regular rhythm. Tachycardia present. Heart sounds: Normal heart sounds. No murmur. Pulmonary:      Effort: Pulmonary effort is normal.      Breath sounds: Normal breath sounds. Comments: Has an occasional cough. No wheezing at this time. No acute distress. Abdominal:      General: Bowel sounds are normal.      Palpations: Abdomen is soft. Abdomen is not rigid. Tenderness: There is no abdominal tenderness.  There is no guarding or rebound. Skin:     General: Skin is warm and dry. Findings: No abrasion or rash. Neurological:      Mental Status: He is alert and oriented to person, place, and time. GCS: GCS eye subscore is 4. GCS verbal subscore is 5. GCS motor subscore is 6. Cranial Nerves: No cranial nerve deficit. Sensory: No sensory deficit. Coordination: Coordination normal.      Gait: Gait normal.         Procedures    MDM  Number of Diagnoses or Management Options  Diagnosis management comments: Patient is taking Coricidin for the cough. I also told him he can take Robitussin straight as well for congestion.         --------------------------------------------- PAST HISTORY ---------------------------------------------  Past Medical History:  has a past medical history of Hyperlipidemia and Hypertension. Past Surgical History:  has a past surgical history that includes fracture surgery. Social History:  reports that he quit smoking about 10 years ago. His smoking use included cigarettes. He has a 5.00 pack-year smoking history. His smokeless tobacco use includes chew. He reports current alcohol use. He reports that he does not use drugs. Family History: family history includes Atrial Fibrillation in his maternal grandfather; Cancer in his paternal grandmother; Cancer (age of onset: 46) in his father; Coronary Art Dis in his father; Diabetes in his father, mother, and paternal grandmother; Heart Attack in his maternal grandfather; High Blood Pressure in his sister; High Cholesterol in his father and mother. The patients home medications have been reviewed. Allergies: Patient has no known allergies. -------------------------------------------------- RESULTS -------------------------------------------------  No results found for this visit on 01/29/20.   No orders to display       ------------------------- NURSING NOTES AND VITALS REVIEWED ---------------------------   The nursing notes within

## 2020-04-03 RX ORDER — HYDROCHLOROTHIAZIDE 25 MG/1
25 TABLET ORAL DAILY
Qty: 90 TABLET | Refills: 3 | Status: SHIPPED
Start: 2020-04-03 | End: 2021-06-02

## 2020-06-04 ENCOUNTER — TELEPHONE (OUTPATIENT)
Dept: PHARMACY | Facility: CLINIC | Age: 35
End: 2020-06-04

## 2020-06-08 NOTE — TELEPHONE ENCOUNTER
Reached patient for review. Reviewed refill history with patient. Patient states he did not realize he was missing that many doses. Patient states no side effects, takes all meds in the morning- just sometimes runs out the door and forgets to take his meds. Discussed using a pill container, setting pill alarm and putting pill box by his car keys. Patient states he does have a pill box and will start these recommendations. Discussed cholesterol not being at goal as well and due to check up with provider. Susannah Delarosa, PharmD, [facility] Pharmacist  Direct: [phone], Ext [phone]  ====================================  CLINICAL PHARMACY CONSULT: MED RECONCILIATION/REVIEW ADDENDUM    For Pharmacy Admin Tracking Only    PHSO: Yes  Total # of Interventions Recommended: 1  - New Order #: 1 New Medication Order Reason(s):  Adherence  Recommended intervention potential cost savings: 1  Total Interventions Accepted: 1  Time Spent (min): [facility], [address] [place] [address]

## 2020-06-25 RX ORDER — ATORVASTATIN CALCIUM 10 MG/1
10 TABLET, FILM COATED ORAL DAILY
Qty: 90 TABLET | Refills: 1 | Status: SHIPPED
Start: 2020-06-25 | End: 2021-06-02

## 2020-06-25 RX ORDER — LISINOPRIL 20 MG/1
20 TABLET ORAL DAILY
Qty: 90 TABLET | Refills: 1 | Status: SHIPPED
Start: 2020-06-25 | End: 2021-06-02

## 2020-07-18 ENCOUNTER — VIRTUAL VISIT (OUTPATIENT)
Dept: FAMILY MEDICINE CLINIC | Age: 35
End: 2020-07-18
Payer: COMMERCIAL

## 2020-07-18 PROCEDURE — 99213 OFFICE O/P EST LOW 20 MIN: CPT | Performed by: FAMILY MEDICINE

## 2020-07-18 NOTE — PROGRESS NOTES
1 tablet by mouth daily 90 tablet 1    atorvastatin (LIPITOR) 10 MG tablet Take 1 tablet by mouth daily 90 tablet 1    hydroCHLOROthiazide (HYDRODIURIL) 25 MG tablet TAKE 1 TABLET BY MOUTH DAILY 90 tablet 3    hydrocortisone 2.5 % cream Apply topically 2 times daily. 20 g 0    ibuprofen (ADVIL;MOTRIN) 800 MG tablet Take 1 tablet by mouth 3 times daily (with meals) 90 tablet 5     No current facility-administered medications on file prior to visit. No Known Allergies    Social History     Tobacco Use    Smoking status: Former Smoker     Packs/day: 0.50     Years: 10.00     Pack years: 5.00     Types: Cigarettes     Last attempt to quit: 1/1/2010     Years since quitting: 10.5    Smokeless tobacco: Current User     Types: Chew   Substance Use Topics    Alcohol use: Yes     Comment: occasionally    Drug use: No       ROS:   Review of Systems - General ROS: negative  Respiratory ROS: no cough, shortness of breath, or wheezing  Cardiovascular ROS: no chest pain or dyspnea on exertion  Gastrointestinal ROS: no abdominal pain, change in bowel habits, or black or bloody stools    Physical Exam:    General: well appearing, NAD  Skin: well perfused   Psych: mood and affect wnl     Most recent labs and imaging reviewed. Findings include:     Due for labs     Assessments:     Diagnoses and all orders for this visit:    Essential hypertension  -     COMPREHENSIVE METABOLIC PANEL; Future  -     LIPID PANEL; Future  -     CBC; Future    Hyperlipidemia, unspecified hyperlipidemia type  -     COMPREHENSIVE METABOLIC PANEL; Future  -     LIPID PANEL; Future          Plans:  Due for blood work   Continue current meds  Will come in for nurse visit to check bp     Orders as above. RTO 3 months or sooner prn. Advised to please be adherent to the treatment plans discussed today, and please call with any questions or concerns, letting the office know of any reasons that the plans may not be followed.   The risks of untreated conditions include worsening illness, injury, disability, and possibly, death. Please call if symptoms change in any way, worsen, or fail to completely resolve, as this could necessitate a change to treatment plans. Patient and/or caregiver expressed understanding. Indications and proper use of medication(s) reviewed. Potential side-effects and risks of medication(s) also explained. Patient and/or caregiver was instructed to call if any new symptoms develop prior to next visit. This visit was performed during the 2239 public health crisis and COVID-19 pandemic.   *Add 95 modifier to all Video Visits*

## 2020-08-22 ENCOUNTER — HOSPITAL ENCOUNTER (EMERGENCY)
Age: 35
Discharge: HOME | End: 2020-08-22
Payer: COMMERCIAL

## 2020-08-22 ENCOUNTER — APPOINTMENT (OUTPATIENT)
Dept: GENERAL RADIOLOGY | Age: 35
End: 2020-08-22
Payer: COMMERCIAL

## 2020-08-22 VITALS
TEMPERATURE: 97 F | HEIGHT: 67 IN | WEIGHT: 315 LBS | OXYGEN SATURATION: 99 % | RESPIRATION RATE: 20 BRPM | HEART RATE: 108 BPM | DIASTOLIC BLOOD PRESSURE: 96 MMHG | BODY MASS INDEX: 49.44 KG/M2 | SYSTOLIC BLOOD PRESSURE: 173 MMHG

## 2020-08-22 PROCEDURE — 99283 EMERGENCY DEPT VISIT LOW MDM: CPT

## 2020-08-22 PROCEDURE — 99282 EMERGENCY DEPT VISIT SF MDM: CPT

## 2020-08-22 PROCEDURE — 73070 X-RAY EXAM OF ELBOW: CPT

## 2020-08-22 RX ORDER — HYDROCODONE BITARTRATE AND ACETAMINOPHEN 5; 325 MG/1; MG/1
1 TABLET ORAL EVERY 6 HOURS PRN
Qty: 12 TABLET | Refills: 0 | Status: SHIPPED | OUTPATIENT
Start: 2020-08-22 | End: 2020-08-25

## 2020-08-22 ASSESSMENT — PAIN SCALES - GENERAL: PAINLEVEL_OUTOF10: 8

## 2020-08-22 ASSESSMENT — PAIN DESCRIPTION - PAIN TYPE: TYPE: ACUTE PAIN

## 2020-08-22 ASSESSMENT — PAIN DESCRIPTION - LOCATION: LOCATION: ARM

## 2020-08-22 ASSESSMENT — PAIN DESCRIPTION - ORIENTATION: ORIENTATION: RIGHT

## 2020-08-22 NOTE — ED PROVIDER NOTES
[facility]       Department of Emergency Medicine   ED  Provider Note  Admit Date/RoomTime: 8/22/2020  4:06 PM  ED Room: [place] [address]  Chief Complaint:   Arm Pain (right)    History of Present Illness   Source of history provided by:  patient. History/Exam Limitations: none. Julieth Sexton is a 37 y.o. old male with a past medical history of:   Past Medical History:   Diagnosis Date    Hyperlipidemia     Hypertension     presents to the emergency department by private vehicle, for Right lower biceps pain which occured a few minute(s) prior to arrival.  Patient states that he was lifting a tractor and felt a snap in his arm, pain. He denies previous injury/surgery. Increased pain w/palpation and ROM. Nothing makes it better. ROS   Pertinent positives and negatives are stated within HPI, all other systems reviewed and are negative. Past Surgical History:   Procedure Laterality Date    FRACTURE SURGERY      left knee   Social History:  reports that he quit smoking about 10 years ago. His smoking use included cigarettes. He has a 5.00 pack-year smoking history. His smokeless tobacco use includes chew. He reports current alcohol use. He reports that he does not use drugs. Family History: family history includes Atrial Fibrillation in his maternal grandfather; Cancer in his paternal grandmother; Cancer (age of onset: 46) in his father; Coronary Art Dis in his father; Diabetes in his father, mother, and paternal grandmother; Heart Attack in his maternal grandfather; High Blood Pressure in his sister; High Cholesterol in his father and mother. Allergies: Patient has no known allergies.     Physical Exam           ED Triage Vitals   BP Temp Temp Source Pulse Resp SpO2 Height Weight   08/22/20 1606 08/22/20 1558 08/22/20 1558 08/22/20 1606 08/22/20 1606 08/22/20 1606 08/22/20 1605 08/22/20 1605   (!) 173/96 97 °F (36.1 °C) Infrared 108 20 99 % 5' 7\" (1.702 m) (!) 400 lb (181.4 kg)      Oxygen with the plan. Assessment      1. Rupture of right biceps tendon, initial encounter      Plan   Discharge to home  Patient condition is good    New Medications     Discharge Medication List as of 8/22/2020  5:16 PM      START taking these medications    Details   HYDROcodone-acetaminophen (NORCO) 5-325 MG per tablet Take 1 tablet by mouth every 6 hours as needed for Pain for up to 3 days. , Disp-12 tablet,R-0Print           Electronically signed by TRACY Ram   DD: 8/22/20  **This report was transcribed using voice recognition software. Every effort was made to ensure accuracy; however, inadvertent computerized transcription errors may be present.   END OF ED PROVIDER NOTE       Rachel Ram  08/22/20 9350

## 2020-08-24 ENCOUNTER — OFFICE VISIT (OUTPATIENT)
Dept: ORTHOPEDIC SURGERY | Age: 35
End: 2020-08-24
Payer: COMMERCIAL

## 2020-08-24 VITALS — WEIGHT: 315 LBS | HEIGHT: 67 IN | BODY MASS INDEX: 49.44 KG/M2

## 2020-08-24 PROCEDURE — 99203 OFFICE O/P NEW LOW 30 MIN: CPT | Performed by: ORTHOPAEDIC SURGERY

## 2020-08-24 NOTE — PROGRESS NOTES
Claudia Loyola is a 37 y.o. male, who presents   Chief Complaint   Patient presents with    New Patient     new pt consult for right bicep tendon. HPI[de-identified] Injury occurred 2 days ago. Patient's daughter was on a tractor in the yard and apparently got it caught in right. He was trying to lift up and push it so she can get it out and experienced a pop in his right elbow area. There was pain associated. He put ice on it initially but then later went to urgent or emergent care and was evaluated there including x-ray. He was put in a sling and was told that he had a biceps tendon rupture. Allergies; medications; past medical, surgical, family, and social history; and problem list have been reviewed today and updated as indicated in this encounter - see below following Ortho specifics. Musculoskeletal: Skin condition gross neurovascular function good in the right upper extremity. Patient holds the right elbow in a guarded position. Hand and wrist motion are good and shoulder motion is good. The elbow is limited as mentioned. There is no discoloration at this time. There is tenderness palpation the anterior elbow with limited range of motion allowed. Exaggerated motion was not attempted because of the suspected injury. It does seem that his biceps muscle was retracted slightly proximal.  The normal biceps tendon prominence in the elbow is not palpable. He has some discomfort with supination pronation. Radiologic Studies: Imaging studies of the right elbow 2 views were grossly normal.    ASSESSMENT:  Yakov Mccartney was seen today for new patient. Diagnoses and all orders for this visit:    Biceps rupture, distal, right, initial encounter     Treatment alternatives were reviewed including medical and physical therapies, injections, and surgical options, expected risks benefits and likely outcome of each were discussed in detail, questions asked and answered and understood.   We discussed the symptoms and the history of the injury as well as physical examination. This is consistent with a distal biceps avulsion. We will get an MRI to better evaluate the soft tissues. We did discuss surgical treatment of this particular considering his age and activities. The surgery, risk, prognosis, limitations and rehab issues were discussed in detail with parent good understanding. PLAN: MRI evaluation right elbow and if avulsion is confirmed, surgical repair. Patient Active Problem List   Diagnosis    Tobacco abuse    Essential hypertension    Skin tags, multiple acquired    MADDIE (obstructive sleep apnea)       Past Medical History:   Diagnosis Date    Hyperlipidemia     Hypertension        Past Surgical History:   Procedure Laterality Date    FRACTURE SURGERY      left knee       Current Outpatient Medications   Medication Sig Dispense Refill    HYDROcodone-acetaminophen (NORCO) 5-325 MG per tablet Take 1 tablet by mouth every 6 hours as needed for Pain for up to 3 days. 12 tablet 0    lisinopril (PRINIVIL;ZESTRIL) 20 MG tablet Take 1 tablet by mouth daily 90 tablet 1    atorvastatin (LIPITOR) 10 MG tablet Take 1 tablet by mouth daily 90 tablet 1    hydroCHLOROthiazide (HYDRODIURIL) 25 MG tablet TAKE 1 TABLET BY MOUTH DAILY 90 tablet 3    ibuprofen (ADVIL;MOTRIN) 800 MG tablet Take 1 tablet by mouth 3 times daily (with meals) 90 tablet 5    hydrocortisone 2.5 % cream Apply topically 2 times daily. (Patient not taking: Reported on 8/24/2020) 20 g 0     No current facility-administered medications for this visit.         No Known Allergies    Social History     Socioeconomic History    Marital status:      Spouse name: None    Number of children: None    Years of education: None    Highest education level: None   Occupational History    None   Social Needs    Financial resource strain: None    Food insecurity     Worry: None     Inability: None    Transportation needs Medical: None     Non-medical: None   Tobacco Use    Smoking status: Former Smoker     Packs/day: 0.50     Years: 10.00     Pack years: 5.00     Types: Cigarettes     Last attempt to quit: 1/1/2010     Years since quitting: 10.6    Smokeless tobacco: Current User     Types: Chew   Substance and Sexual Activity    Alcohol use: Yes     Comment: occasionally    Drug use: No    Sexual activity: Yes     Partners: Female     Comment: 1 partner -  7 years    Lifestyle    Physical activity     Days per week: None     Minutes per session: None    Stress: None   Relationships    Social connections     Talks on phone: None     Gets together: None     Attends Sabianism service: None     Active member of club or organization: None     Attends meetings of clubs or organizations: None     Relationship status: None    Intimate partner violence     Fear of current or ex partner: None     Emotionally abused: None     Physically abused: None     Forced sexual activity: None   Other Topics Concern    None   Social History Narrative    None       Family History   Problem Relation Age of Onset    Diabetes Mother     High Cholesterol Mother     Cancer Father 46        Colon cancer    Coronary Art Dis Father     Diabetes Father     High Cholesterol Father     High Blood Pressure Sister     Atrial Fibrillation Maternal Grandfather     Heart Attack Maternal Grandfather     Diabetes Paternal Grandmother     Cancer Paternal Grandmother          Review of Systems:   As follows except as previously noted in HPI:  Constitutional: Negative for chills, diaphoresis,  fever   Respiratory: Negative for cough, shortness of breath and wheezing. Cardiovascular: Negative for chest pain and palpitations. Neurological: Negative for dizziness, syncope,   GI / : abdominal pain or cramping  Musculoskeletal: see HPI       Objective:   Physical Exam   Constitutional: Oriented to person, place, and time.  and appears

## 2020-08-25 DIAGNOSIS — S46.211A BICEPS RUPTURE, DISTAL, RIGHT, INITIAL ENCOUNTER: Primary | ICD-10-CM

## 2020-08-25 DIAGNOSIS — T15.90XA FOREIGN BODY IN EYE, UNSPECIFIED LATERALITY, INITIAL ENCOUNTER: ICD-10-CM

## 2020-09-01 ENCOUNTER — HOSPITAL ENCOUNTER (OUTPATIENT)
Age: 35
Discharge: HOME | End: 2020-09-03
Payer: COMMERCIAL

## 2020-09-01 LAB
ALBUMIN SERPL-MCNC: 4.3 G/DL (ref 3.5–5.2)
ALP BLD-CCNC: 66 U/L (ref 40–129)
ALT SERPL-CCNC: 20 U/L (ref 0–40)
ANION GAP SERPL CALCULATED.3IONS-SCNC: 14 MMOL/L (ref 7–16)
AST SERPL-CCNC: 20 U/L (ref 0–39)
BILIRUB SERPL-MCNC: 0.7 MG/DL (ref 0–1.2)
BUN BLDV-MCNC: 14 MG/DL (ref 6–20)
CALCIUM SERPL-MCNC: 9.6 MG/DL (ref 8.6–10.2)
CHLORIDE BLD-SCNC: 101 MMOL/L (ref 98–107)
CHOLESTEROL, TOTAL: 200 MG/DL (ref 0–199)
CO2: 26 MMOL/L (ref 22–29)
CREAT SERPL-MCNC: 0.7 MG/DL (ref 0.7–1.2)
GFR AFRICAN AMERICAN: >60
GFR NON-AFRICAN AMERICAN: >60 ML/MIN/1.73
GLUCOSE BLD-MCNC: 92 MG/DL (ref 74–99)
HCT VFR BLD CALC: 48.3 % (ref 37–54)
HDLC SERPL-MCNC: 46 MG/DL
HEMOGLOBIN: 14.8 G/DL (ref 12.5–16.5)
LDL CHOLESTEROL CALCULATED: 129 MG/DL (ref 0–99)
MCH RBC QN AUTO: 26.1 PG (ref 26–35)
MCHC RBC AUTO-ENTMCNC: 30.6 % (ref 32–34.5)
MCV RBC AUTO: 85.2 FL (ref 80–99.9)
PDW BLD-RTO: 13.8 FL (ref 11.5–15)
PLATELET # BLD: 347 E9/L (ref 130–450)
PMV BLD AUTO: 9.9 FL (ref 7–12)
POTASSIUM SERPL-SCNC: 4.9 MMOL/L (ref 3.5–5)
RBC # BLD: 5.67 E12/L (ref 3.8–5.8)
SODIUM BLD-SCNC: 141 MMOL/L (ref 132–146)
TOTAL PROTEIN: 7.6 G/DL (ref 6.4–8.3)
TRIGL SERPL-MCNC: 125 MG/DL (ref 0–149)
VLDLC SERPL CALC-MCNC: 25 MG/DL
WBC # BLD: 7.7 E9/L (ref 4.5–11.5)

## 2020-09-01 PROCEDURE — 80053 COMPREHEN METABOLIC PANEL: CPT

## 2020-09-01 PROCEDURE — 85027 COMPLETE CBC AUTOMATED: CPT

## 2020-09-01 PROCEDURE — 80061 LIPID PANEL: CPT

## 2020-09-01 PROCEDURE — 36415 COLL VENOUS BLD VENIPUNCTURE: CPT

## 2020-09-02 ENCOUNTER — OFFICE VISIT (OUTPATIENT)
Dept: ORTHOPEDIC SURGERY | Age: 35
End: 2020-09-02
Payer: COMMERCIAL

## 2020-09-02 ENCOUNTER — OFFICE VISIT (OUTPATIENT)
Dept: FAMILY MEDICINE CLINIC | Age: 35
End: 2020-09-02
Payer: COMMERCIAL

## 2020-09-02 VITALS
TEMPERATURE: 98 F | HEIGHT: 67 IN | HEART RATE: 83 BPM | OXYGEN SATURATION: 98 % | BODY MASS INDEX: 49.44 KG/M2 | RESPIRATION RATE: 18 BRPM | WEIGHT: 315 LBS | SYSTOLIC BLOOD PRESSURE: 122 MMHG | DIASTOLIC BLOOD PRESSURE: 86 MMHG

## 2020-09-02 VITALS — HEIGHT: 67 IN | TEMPERATURE: 98 F | BODY MASS INDEX: 49.44 KG/M2 | WEIGHT: 315 LBS

## 2020-09-02 PROCEDURE — 99213 OFFICE O/P EST LOW 20 MIN: CPT | Performed by: FAMILY MEDICINE

## 2020-09-02 PROCEDURE — 93000 ELECTROCARDIOGRAM COMPLETE: CPT | Performed by: FAMILY MEDICINE

## 2020-09-02 PROCEDURE — 99213 OFFICE O/P EST LOW 20 MIN: CPT | Performed by: ORTHOPAEDIC SURGERY

## 2020-09-02 ASSESSMENT — ENCOUNTER SYMPTOMS
VOMITING: 0
COUGH: 0
WHEEZING: 0
NAUSEA: 0
SHORTNESS OF BREATH: 0

## 2020-09-02 ASSESSMENT — PATIENT HEALTH QUESTIONNAIRE - PHQ9
SUM OF ALL RESPONSES TO PHQ9 QUESTIONS 1 & 2: 0
2. FEELING DOWN, DEPRESSED OR HOPELESS: 0
SUM OF ALL RESPONSES TO PHQ QUESTIONS 1-9: 0
1. LITTLE INTEREST OR PLEASURE IN DOING THINGS: 0
SUM OF ALL RESPONSES TO PHQ QUESTIONS 1-9: 0

## 2020-09-02 NOTE — PROGRESS NOTES
[address] [facility]  [phone]   Ria Nolan MD     Patient: Julieth Sexton  YOB: 1985  Visit Date: 9/2/20    Román Correia is a 37y.o. year old male here today for   Chief Complaint   Patient presents with    Pre-op Exam     Pt is having surgery 9/8/2020 on RT biceps distal.       HPI  Patient is a 37year old male here for preop visit for distal biceps repair on right 9/8/2020 at [facility]. No issues with anesthesia in the past.   Can walk up a flight of stairs without feeling short of breath. Denies fevers, chills, N/V, chest pain, sob, leg swelling. On lipitor taking it at night. Review of Systems   Constitutional: Negative for chills and fever. Respiratory: Negative for cough, shortness of breath and wheezing. Cardiovascular: Negative for chest pain, palpitations and leg swelling. Gastrointestinal: Negative for nausea and vomiting. Current Outpatient Medications on File Prior to Visit   Medication Sig Dispense Refill    lisinopril (PRINIVIL;ZESTRIL) 20 MG tablet Take 1 tablet by mouth daily 90 tablet 1    atorvastatin (LIPITOR) 10 MG tablet Take 1 tablet by mouth daily (Patient taking differently: Take 10 mg by mouth nightly ) 90 tablet 1    hydroCHLOROthiazide (HYDRODIURIL) 25 MG tablet TAKE 1 TABLET BY MOUTH DAILY 90 tablet 3    ibuprofen (ADVIL;MOTRIN) 800 MG tablet Take 1 tablet by mouth 3 times daily (with meals) 90 tablet 5    hydrocortisone 2.5 % cream Apply topically 2 times daily. 20 g 0     No current facility-administered medications on file prior to visit. No Known Allergies    Past medical, surgical, socialand/or family history reviewed, updated as needed, and are non-contributory (unless otherwise stated). Medications, allergies, and problem list also reviewed and updated as needed in patient's record.     Wt Readings from Last 3 Encounters:   09/08/20 (!) 419 lb 3.2 oz (190.1 kg) 09/02/20 (!) 418 lb (189.6 kg)   09/02/20 (!) 419 lb (190.1 kg)                   /86   Pulse 83   Temp 98 °F (36.7 °C)   Resp 18   Ht 5' 7\" (1.702 m)   Wt (!) 418 lb (189.6 kg)   SpO2 98%   BMI 65.47 kg/m²        Physical Exam  Vitals signs and nursing note reviewed. Constitutional:       General: He is not in acute distress. Appearance: He is well-developed. He is not diaphoretic. Comments: Obesity   HENT:      Head: Normocephalic and atraumatic. Cardiovascular:      Rate and Rhythm: Normal rate and regular rhythm. Heart sounds: Normal heart sounds. No murmur. Pulmonary:      Effort: Pulmonary effort is normal. No respiratory distress. Breath sounds: Normal breath sounds. No stridor. No wheezing or rales. Abdominal:      General: Bowel sounds are normal. There is no distension. Palpations: Abdomen is soft. Tenderness: There is no abdominal tenderness. There is no guarding or rebound. Musculoskeletal: Normal range of motion. General: No tenderness. Skin:     General: Skin is warm and dry.    Psychiatric:         Behavior: Behavior normal.       Results for orders placed or performed during the hospital encounter of 09/01/20   CBC   Result Value Ref Range    WBC 7.7 4.5 - 11.5 E9/L    RBC 5.67 3.80 - 5.80 E12/L    Hemoglobin 14.8 12.5 - 16.5 g/dL    Hematocrit 48.3 37.0 - 54.0 %    MCV 85.2 80.0 - 99.9 fL    MCH 26.1 26.0 - 35.0 pg    MCHC 30.6 (L) 32.0 - 34.5 %    RDW 13.8 11.5 - 15.0 fL    Platelets 745 101 - 310 E9/L    MPV 9.9 7.0 - 12.0 fL   LIPID PANEL   Result Value Ref Range    Cholesterol, Total 200 (H) 0 - 199 mg/dL    Triglycerides 125 0 - 149 mg/dL    HDL 46 >40 mg/dL    LDL Calculated 129 (H) 0 - 99 mg/dL    VLDL Cholesterol Calculated 25 mg/dL   COMPREHENSIVE METABOLIC PANEL   Result Value Ref Range    Sodium 141 132 - 146 mmol/L    Potassium 4.9 3.5 - 5.0 mmol/L    Chloride 101 98 - 107 mmol/L    CO2 26 22 - 29 mmol/L    Anion Gap 14 7 -

## 2020-09-02 NOTE — PROGRESS NOTES
Chief Complaint:   Chief Complaint   Patient presents with    Arm Pain     MRI f/u right bicep/ elbow       Everett Betts follows up for his right elbow injury. He had his MRI done. He brought the disc for review. He is having little bit of difficulty sleeping. He has not been using the arm for much. He has been working but doing sedentary work at his job site. Allergies; medications; past medical, surgical, family, and social history; and problem list have been reviewed today and updated as indicated in this encounter seen below. Exam: The neurovascular function in the arms intact. He is in the sling in a protected position without any acute distress. Radiographs: MRI from [facility] imaging 9/1/2020 was reviewed with the patient and imaging and report were explained. He does have distal biceps tendon avulsion with retraction. ASSESSMENT:    Janette Babin was seen today for arm pain. Diagnoses and all orders for this visit:    Biceps rupture, distal, right, initial encounter        PLAN: We discussed operative repair of the distal biceps tendon. The surgery, risks including but not limited to scar, infection, nerve injury and failure to heal, prognosis, limitations and rehab issues discussed in detail with apparent good understanding. He like to get this fixed as soon as possible and get him scheduled. The patient was counseled at length about the risks of salome Covid-19 during their perioperative period and any recovery window from their procedure. The patient was made aware that salome Covid-19  may worsen their prognosis for recovering from their procedure  and lend to a higher morbidity and/or mortality risk. All material risks, benefits, and reasonable alternatives including postponing the procedure were discussed. The patient does wish to proceed with the procedure at this time. No follow-ups on file.        Current Outpatient Medications   Medication Sig Dispense Refill    lisinopril (PRINIVIL;ZESTRIL) 20 MG tablet Take 1 tablet by mouth daily 90 tablet 1    atorvastatin (LIPITOR) 10 MG tablet Take 1 tablet by mouth daily 90 tablet 1    hydroCHLOROthiazide (HYDRODIURIL) 25 MG tablet TAKE 1 TABLET BY MOUTH DAILY 90 tablet 3    hydrocortisone 2.5 % cream Apply topically 2 times daily. 20 g 0    ibuprofen (ADVIL;MOTRIN) 800 MG tablet Take 1 tablet by mouth 3 times daily (with meals) 90 tablet 5     No current facility-administered medications for this visit.         Patient Active Problem List   Diagnosis    Tobacco abuse    Essential hypertension    Skin tags, multiple acquired    MADDIE (obstructive sleep apnea)       Past Medical History:   Diagnosis Date    Hyperlipidemia     Hypertension        Past Surgical History:   Procedure Laterality Date    FRACTURE SURGERY      left knee       No Known Allergies    Social History     Socioeconomic History    Marital status:      Spouse name: None    Number of children: None    Years of education: None    Highest education level: None   Occupational History    None   Social Needs    Financial resource strain: None    Food insecurity     Worry: None     Inability: None    Transportation needs     Medical: None     Non-medical: None   Tobacco Use    Smoking status: Former Smoker     Packs/day: 0.50     Years: 10.00     Pack years: 5.00     Types: Cigarettes     Last attempt to quit: 1/1/2010     Years since quitting: 10.6    Smokeless tobacco: Current User     Types: Chew   Substance and Sexual Activity    Alcohol use: Yes     Comment: occasionally    Drug use: No    Sexual activity: Yes     Partners: Female     Comment: 1 partner -  7 years    Lifestyle    Physical activity     Days per week: None     Minutes per session: None    Stress: None   Relationships    Social connections     Talks on phone: None     Gets together: None     Attends Hindu service: None     Active member of club or organization: None     Attends meetings of clubs or organizations: None     Relationship status: None    Intimate partner violence     Fear of current or ex partner: None     Emotionally abused: None     Physically abused: None     Forced sexual activity: None   Other Topics Concern    None   Social History Narrative    None       Review of Systems  As follows except as previously noted in HPI:  Constitutional: Negative for chills, diaphoresis, fatigue, fever and unexpected weight change. Respiratory: Negative for cough, shortness of breath and wheezing. Cardiovascular: Negative for chest pain and palpitations. Neurological: Negative for dizziness, syncope, cephalgia. GI / : negative  Musculoskeletal: see HPI       Objective:   Physical Exam   Constitutional: Oriented to person, place, and time. and appears well-developed and well-nourished. :   Head: Normocephalic and atraumatic. Eyes: EOM are normal.   Neck: Neck supple. Cardiovascular: Normal rate and regular rhythm. Pulmonary/Chest: Effort normal. No stridor. No respiratory distress, no wheezes. Abdominal:  No abnormal distension. Neurological: Alert and oriented to person, place, and time. Skin: Skin is warm and dry. Psychiatric: Normal mood and affect.  Behavior is normal. Thought content normal.    K Elbert Klinefelter,     9/2/20  10:06 AM

## 2020-09-03 ENCOUNTER — HOSPITAL ENCOUNTER (OUTPATIENT)
Age: 35
Discharge: HOME | End: 2020-09-05
Payer: COMMERCIAL

## 2020-09-03 PROCEDURE — U0003 INFECTIOUS AGENT DETECTION BY NUCLEIC ACID (DNA OR RNA); SEVERE ACUTE RESPIRATORY SYNDROME CORONAVIRUS 2 (SARS-COV-2) (CORONAVIRUS DISEASE [COVID-19]), AMPLIFIED PROBE TECHNIQUE, MAKING USE OF HIGH THROUGHPUT TECHNOLOGIES AS DESCRIBED BY CMS-2020-01-R: HCPCS

## 2020-09-03 NOTE — PROGRESS NOTES
After Visit Summary   6/28/2018    Dimitri Neves    MRN: [id number]           Patient Information     Date Of Birth          1939        Visit Information        Provider Department      6/28/2018 12:00 PM Rafael Valenzuela MD [facility]        Today's Diagnoses     Malignant neoplasm of prostate (H)    -  1    Bone metastasis (H)           Follow-ups after your visit        Your next 10 appointments already scheduled     Aug 09, 2018  8:45 AM CDT   Return Visit with [facility])    [id number] [place] [address]   [phone]            Aug 09, 2018  9:15 AM CDT   Level O with [place] [facility])    [id number] [place] [id number]   [phone]            Oct 01, 2018  9:45 AM CDT   LAB with LAB ON[facility])    [id number] [place] [address]   [phone]           Please do not eat 10-12 hours before your appointment if you are coming in fasting for labs on lipids, cholesterol, or glucose (sugar). This does not apply to pregnant women. Water, hot tea and black coffee (with nothing added) are okay. Do not drink other fluids, diet soda or chew gum.            Oct 01, 2018 10:30 AM CDT   Return Visit with Rafael Valenzuela MD   [facility])    [id number] [place] [address]   [phone]            Oct 01, 2018 11:00 AM CDT   Level O with [place] [facility])    [address] [place] [address]   [phone]            Nov 27, 2018 10:30 AM CST   Return Visit with Senthil Jhaveri MD   [facility]    [address] [facility] [place] [address]  [address] [facility]                                                                                                                    PRE OP INSTRUCTIONS FOR  Hebert Lauren        Date: 9/3/2020    Date of surgery:  9/8/2020    Arrival Time: Hospital will call you between 5pm and 7pm with your final arrival time for surgery    1. Do not eat or drink anything after    Midnight   prior to surgery. This includes no water, chewing gum, mints or ice chips. 2. Take the following medications with a small sip of water on the morning of Surgery:      3. Diabetics may take evening dose of insulin but none after midnight. If you feel symptomatic or low blood sugar morning of surgery drink 1-2 ounces of apple juice only. 4. Aspirin, Ibuprofen, Advil, Naproxen, Vitamin E and other Anti-inflammatory products should be stopped  before surgery  as directed by your physician. Take Tylenol only unless instructed otherwise by your surgeon. 5. Check with your Doctor regarding stopping Plavix, Coumadin, Lovenox, Eliquis, Effient, or other blood thinners. 6. Do not smoke,use illicit drugs and do not drink any alcoholic beverages 24 hours prior to surgery. 7. You may brush your teeth the morning of surgery. DO NOT SWALLOW WATER    8. You MUST make arrangements for a responsible adult to take you home after your surgery. You will not be allowed to leave alone or drive yourself home. It is strongly suggested someone stay with you the first 24 hrs. Your surgery will be cancelled if you do not have a ride home. 9. PEDIATRIC PATIENTS ONLY:  A parent/legal guardian must accompany a child scheduled for surgery and plan to stay at the hospital until the child is discharged. Please do not bring other children with you.     10. Please wear simple, loose fitting clothing to the hospital.  Mirna Cadetdelilah not bring valuables (money, credit cards, checkbooks, etc.) Do not wear any makeup (including no eye makeup) or   [phone]              Who to contact     If you have questions or need follow up information about today's clinic visit or your schedule please contact [facility] directly at [phone].  Normal or non-critical lab and imaging results will be communicated to you by Talysthart, letter or phone within 4 business days after the clinic has received the results. If you do not hear from us within 7 days, please contact the clinic through Talysthart or phone. If you have a critical or abnormal lab result, we will notify you by phone as soon as possible.  Submit refill requests through Personaling or call your pharmacy and they will forward the refill request to us. Please allow 3 business days for your refill to be completed.          Additional Information About Your Visit        Personaling Information     Personaling gives you secure access to your electronic health record. If you see a primary care provider, you can also send messages to your care team and make appointments. If you have questions, please call your primary care clinic.  If you do not have a primary care provider, please call [phone] and they will assist you.      Personaling is an electronic gateway that provides easy, online access to your medical records. With Personaling, you can request a clinic appointment, read your test results, renew a prescription or communicate with your care team.     To access your existing account, please contact your [facility] or call [phone] for assistance.        Care EveryWhere ID     This is your Care EveryWhere ID. This could be used by other organizations to access your [facility] medical records  [id number]        Your Vitals Were     Pulse Temperature Respirations Pulse Oximetry BMI (Body Mass Index)       57 97.4  F (36.3  C) (Oral) 16 98% 27.21 kg/m2        Blood Pressure from Last 3 Encounters:   06/28/18 144/73   05/29/18 120/64   05/24/18 146/73    Weight from Last 3  nail polish on your fingers or toes. 11. DO NOT wear any jewelry or piercings on day of surgery. All body piercing jewelry must be removed. 12. Shower the night before surgery with _x__Antibacterial soap /STEPHANIE WIPES________    13. TOTAL JOINT REPLACEMENT/HYSTERECTOMY PATIENTS ONLY---Remember to bring Blood Bank bracelet to the hospital on the day of surgery. 14. If you have a Living Will and Durable Power of  for Healthcare, please bring in a copy. 15. If appropriate bring crutches, inspirex, WALKER, CANE etc... 12. Notify your Surgeon if you develop any illness between now and surgery time, cough, cold, fever, sore throat, nausea, vomiting, etc.  Please notify your surgeon if you experience dizziness, shortness of breath or blurred vision between now & the time of your surgery. 17. If you have ___dentures, they will be removed before going to the OR; we will provide you a container. If you wear ___contact lenses or ___glasses, they will be removed; please bring a case for them. 18. To provide excellent care visitors will be limited to 2 in the room at any given time. 19. Please bring picture ID and insurance card. 20. Sleep apnea patients need to bring CPAP AND SETTINGS to hospital on day of surgery. 21. During flu season no children under the age of 15 are permitted in the hospital for the safety of all patients. 22. Other                  Please call AMBULATORY CARE if you have any further questions.    [address] [facility]     [address] Encounters:   06/28/18 89.7 kg (197 lb 12.8 oz)   05/24/18 88.2 kg (194 lb 6.4 oz)   05/10/18 84.8 kg (187 lb)              Today, you had the following     No orders found for display       Primary Care Provider Office Phone # Fax #    Reginaldo Muir [phone]       [address] [facility] [place] [address]        Equal Access to Services     [facility] Center[facility]ENNIE : Hadii aad ku hadasho Soomaali, waaxda luqadaha, qaybta kaalmada adeegyada, waxay idiin hayaan adeeg kharash la'anjanan . So [facility] [phone].    ATENCIÓN: Si habla español, tiene a roberts disposición servicios gratuitos de asistencia lingüística. [facility] [phone].    We comply with applicable federal civil rights laws and [place] laws. We do not discriminate on the basis of race, color, national origin, age, disability, sex, sexual orientation, or gender identity.            Thank you!     Thank you for choosing [facility]  for your care. Our goal is always to provide you with excellent care. Hearing back from our patients is one way we can continue to improve our services. Please take a few minutes to complete the written survey that you may receive in the mail after your visit with us. Thank you!             Your Updated Medication List - Protect others around you: Learn how to safely use, store and throw away your medicines at www.disposemymeds.org.          This list is accurate as of 6/28/18 12:57 PM.  Always use your most recent med list.                   Brand Name Dispense Instructions for use Diagnosis    abiraterone 250 MG tablet    ZYTIGA    120 tablet    Take 4 tablets (1,000 mg) by mouth daily for 30 doses Take on empty stomach.    Bone metastasis (H), Malignant neoplasm of prostate (H)       albuterol (2.5 MG/3ML) 0.083% neb solution     25 vial    Take 1 vial (2.5 mg) by nebulization every 6 hours as needed for shortness of breath / dyspnea or wheezing    Acute bronchitis, unspecified organism       aspirin 325  MG EC tablet     1 tablet    Take 1 tablet (325 mg) by mouth daily    Transient cerebral ischemia, unspecified type       CALCIUM 600 PO           [facility] vitamin  B12 1000 MCG Tabs   Generic drug:  cyanocobalamin     30 tablet    TAKE 1 TABLET BY MOUTH EVERY DAY    Low vitamin B12 level       furosemide 20 MG tablet    LASIX    90 tablet    Take 1 tablet (20 mg) by mouth daily    Bilateral lower extremity edema, HTN (hypertension), benign       gabapentin 300 MG capsule    NEURONTIN     Take 900 mg by mouth At Bedtime        ipratropium - albuterol 0.5 mg/2.5 mg/3 mL 0.5-2.5 (3) MG/3ML neb solution    DUONEB    3 mL    Take 1 vial (3 mLs) by nebulization once for 1 dose    Dyspnea, unspecified type       metoprolol succinate 25 MG 24 hr tablet    TOPROL-XL    90 tablet    TAKE 1 TABLET (25 MG) BY MOUTH DAILY    HTN (hypertension), benign       order for DME     1 Device    Equipment being ordered: Nebulizer    Acute bronchitis, unspecified organism       oxyCODONE IR 5 MG tablet    ROXICODONE    30 tablet    Take 1 tablet (5 mg) by mouth every 6 hours as needed for pain    Narcotic dependence, episodic use (H)       predniSONE 5 MG tablet    DELTASONE    60 tablet    Take 1 tablet (5 mg) by mouth 2 times daily    Bone metastasis (H), Malignant neoplasm of prostate (H)       quinapril 40 MG Tab    ACCUPRIL    90 tablet    TAKE 1 TABLET (40 MG) BY MOUTH DAILY    HTN (hypertension), benign       simvastatin 40 MG tablet    ZOCOR    45 tablet    TAKE 0.5 TABLETS (20 MG) BY MOUTH DAILY    Hyperlipidemia LDL goal <130       vitamin D 2000 units tablet      Take 1 tablet by mouth daily

## 2020-09-07 ENCOUNTER — ANESTHESIA EVENT (OUTPATIENT)
Dept: OPERATING ROOM | Age: 35
End: 2020-09-07
Payer: COMMERCIAL

## 2020-09-08 ENCOUNTER — HOSPITAL ENCOUNTER (OUTPATIENT)
Age: 35
Setting detail: OUTPATIENT SURGERY
Discharge: HOME | End: 2020-09-08
Attending: ORTHOPAEDIC SURGERY | Admitting: ORTHOPAEDIC SURGERY
Payer: COMMERCIAL

## 2020-09-08 ENCOUNTER — ANESTHESIA (OUTPATIENT)
Dept: OPERATING ROOM | Age: 35
End: 2020-09-08
Payer: COMMERCIAL

## 2020-09-08 ENCOUNTER — APPOINTMENT (OUTPATIENT)
Dept: GENERAL RADIOLOGY | Age: 35
End: 2020-09-08
Attending: ORTHOPAEDIC SURGERY
Payer: COMMERCIAL

## 2020-09-08 VITALS
RESPIRATION RATE: 18 BRPM | HEIGHT: 67 IN | BODY MASS INDEX: 49.44 KG/M2 | SYSTOLIC BLOOD PRESSURE: 138 MMHG | WEIGHT: 315 LBS | TEMPERATURE: 97.5 F | OXYGEN SATURATION: 98 % | DIASTOLIC BLOOD PRESSURE: 85 MMHG | HEART RATE: 92 BPM

## 2020-09-08 VITALS
DIASTOLIC BLOOD PRESSURE: 112 MMHG | SYSTOLIC BLOOD PRESSURE: 151 MMHG | TEMPERATURE: 61 F | RESPIRATION RATE: 2 BRPM | OXYGEN SATURATION: 100 %

## 2020-09-08 PROBLEM — S46.211A RUPTURE OF DISTAL BICEPS TENDON, RIGHT, INITIAL ENCOUNTER: Status: ACTIVE | Noted: 2020-09-08

## 2020-09-08 PROCEDURE — 7100000001 HC PACU RECOVERY - ADDTL 15 MIN: Performed by: ORTHOPAEDIC SURGERY

## 2020-09-08 PROCEDURE — 6360000002 HC RX W HCPCS

## 2020-09-08 PROCEDURE — 24342 REPAIR OF RUPTURED TENDON: CPT | Performed by: ORTHOPAEDIC SURGERY

## 2020-09-08 PROCEDURE — 2500000003 HC RX 250 WO HCPCS: Performed by: ORTHOPAEDIC SURGERY

## 2020-09-08 PROCEDURE — 6360000002 HC RX W HCPCS: Performed by: ANESTHESIOLOGY

## 2020-09-08 PROCEDURE — 3209999900 FLUORO FOR SURGICAL PROCEDURES

## 2020-09-08 PROCEDURE — 2580000003 HC RX 258: Performed by: ANESTHESIOLOGY

## 2020-09-08 PROCEDURE — 2580000003 HC RX 258: Performed by: STUDENT IN AN ORGANIZED HEALTH CARE EDUCATION/TRAINING PROGRAM

## 2020-09-08 PROCEDURE — 6360000002 HC RX W HCPCS: Performed by: ORTHOPAEDIC SURGERY

## 2020-09-08 PROCEDURE — 3700000001 HC ADD 15 MINUTES (ANESTHESIA): Performed by: ORTHOPAEDIC SURGERY

## 2020-09-08 PROCEDURE — 6360000002 HC RX W HCPCS: Performed by: STUDENT IN AN ORGANIZED HEALTH CARE EDUCATION/TRAINING PROGRAM

## 2020-09-08 PROCEDURE — C1713 ANCHOR/SCREW BN/BN,TIS/BN: HCPCS | Performed by: ORTHOPAEDIC SURGERY

## 2020-09-08 PROCEDURE — 7100000010 HC PHASE II RECOVERY - FIRST 15 MIN: Performed by: ORTHOPAEDIC SURGERY

## 2020-09-08 PROCEDURE — 6360000002 HC RX W HCPCS: Performed by: ANESTHESIOLOGIST ASSISTANT

## 2020-09-08 PROCEDURE — 3600000004 HC SURGERY LEVEL 4 BASE: Performed by: ORTHOPAEDIC SURGERY

## 2020-09-08 PROCEDURE — 3600000014 HC SURGERY LEVEL 4 ADDTL 15MIN: Performed by: ORTHOPAEDIC SURGERY

## 2020-09-08 PROCEDURE — 2500000003 HC RX 250 WO HCPCS: Performed by: ANESTHESIOLOGIST ASSISTANT

## 2020-09-08 PROCEDURE — 3700000000 HC ANESTHESIA ATTENDED CARE: Performed by: ORTHOPAEDIC SURGERY

## 2020-09-08 PROCEDURE — 2580000003 HC RX 258: Performed by: ORTHOPAEDIC SURGERY

## 2020-09-08 PROCEDURE — 7100000000 HC PACU RECOVERY - FIRST 15 MIN: Performed by: ORTHOPAEDIC SURGERY

## 2020-09-08 PROCEDURE — 2709999900 HC NON-CHARGEABLE SUPPLY: Performed by: ORTHOPAEDIC SURGERY

## 2020-09-08 PROCEDURE — 7100000011 HC PHASE II RECOVERY - ADDTL 15 MIN: Performed by: ORTHOPAEDIC SURGERY

## 2020-09-08 DEVICE — KIT IMPL DST BICEPS BTTN INSRT W/ NO2 FIBERLOOP SUT: Type: IMPLANTABLE DEVICE | Site: ARM | Status: FUNCTIONAL

## 2020-09-08 RX ORDER — SUCCINYLCHOLINE/SOD CL,ISO/PF 200MG/10ML
SYRINGE (ML) INTRAVENOUS PRN
Status: DISCONTINUED | OUTPATIENT
Start: 2020-09-08 | End: 2020-09-08 | Stop reason: SDUPTHER

## 2020-09-08 RX ORDER — MEPERIDINE HYDROCHLORIDE 25 MG/ML
INJECTION INTRAMUSCULAR; INTRAVENOUS; SUBCUTANEOUS
Status: DISCONTINUED
Start: 2020-09-08 | End: 2020-09-08 | Stop reason: HOSPADM

## 2020-09-08 RX ORDER — BUPIVACAINE HYDROCHLORIDE AND EPINEPHRINE 2.5; 5 MG/ML; UG/ML
INJECTION, SOLUTION EPIDURAL; INFILTRATION; INTRACAUDAL; PERINEURAL PRN
Status: DISCONTINUED | OUTPATIENT
Start: 2020-09-08 | End: 2020-09-08 | Stop reason: ALTCHOICE

## 2020-09-08 RX ORDER — HYDROCODONE BITARTRATE AND ACETAMINOPHEN 5; 325 MG/1; MG/1
1 TABLET ORAL EVERY 4 HOURS PRN
Qty: 40 TABLET | Refills: 0 | Status: SHIPPED | OUTPATIENT
Start: 2020-09-08 | End: 2020-09-15

## 2020-09-08 RX ORDER — ROCURONIUM BROMIDE 10 MG/ML
INJECTION, SOLUTION INTRAVENOUS PRN
Status: DISCONTINUED | OUTPATIENT
Start: 2020-09-08 | End: 2020-09-08 | Stop reason: SDUPTHER

## 2020-09-08 RX ORDER — SODIUM CHLORIDE 0.9 % (FLUSH) 0.9 %
10 SYRINGE (ML) INJECTION PRN
Status: DISCONTINUED | OUTPATIENT
Start: 2020-09-08 | End: 2020-09-08 | Stop reason: HOSPADM

## 2020-09-08 RX ORDER — ONDANSETRON 2 MG/ML
INJECTION INTRAMUSCULAR; INTRAVENOUS PRN
Status: DISCONTINUED | OUTPATIENT
Start: 2020-09-08 | End: 2020-09-08 | Stop reason: SDUPTHER

## 2020-09-08 RX ORDER — MEPERIDINE HYDROCHLORIDE 25 MG/ML
25 INJECTION INTRAMUSCULAR; INTRAVENOUS; SUBCUTANEOUS EVERY 5 MIN PRN
Status: COMPLETED | OUTPATIENT
Start: 2020-09-08 | End: 2020-09-08

## 2020-09-08 RX ORDER — MEPERIDINE HYDROCHLORIDE 25 MG/ML
INJECTION INTRAMUSCULAR; INTRAVENOUS; SUBCUTANEOUS
Status: COMPLETED
Start: 2020-09-08 | End: 2020-09-08

## 2020-09-08 RX ORDER — LIDOCAINE HYDROCHLORIDE 20 MG/ML
INJECTION, SOLUTION INTRAVENOUS PRN
Status: DISCONTINUED | OUTPATIENT
Start: 2020-09-08 | End: 2020-09-08 | Stop reason: SDUPTHER

## 2020-09-08 RX ORDER — DEXAMETHASONE SODIUM PHOSPHATE 10 MG/ML
INJECTION, SOLUTION INTRAMUSCULAR; INTRAVENOUS PRN
Status: DISCONTINUED | OUTPATIENT
Start: 2020-09-08 | End: 2020-09-08 | Stop reason: SDUPTHER

## 2020-09-08 RX ORDER — ONDANSETRON 2 MG/ML
4 INJECTION INTRAMUSCULAR; INTRAVENOUS
Status: DISCONTINUED | OUTPATIENT
Start: 2020-09-08 | End: 2020-09-08 | Stop reason: HOSPADM

## 2020-09-08 RX ORDER — FENTANYL CITRATE 50 UG/ML
INJECTION, SOLUTION INTRAMUSCULAR; INTRAVENOUS PRN
Status: DISCONTINUED | OUTPATIENT
Start: 2020-09-08 | End: 2020-09-08 | Stop reason: SDUPTHER

## 2020-09-08 RX ORDER — SODIUM CHLORIDE, SODIUM LACTATE, POTASSIUM CHLORIDE, CALCIUM CHLORIDE 600; 310; 30; 20 MG/100ML; MG/100ML; MG/100ML; MG/100ML
INJECTION, SOLUTION INTRAVENOUS CONTINUOUS
Status: DISCONTINUED | OUTPATIENT
Start: 2020-09-08 | End: 2020-09-08 | Stop reason: HOSPADM

## 2020-09-08 RX ORDER — FENTANYL CITRATE 50 UG/ML
50 INJECTION, SOLUTION INTRAMUSCULAR; INTRAVENOUS EVERY 5 MIN PRN
Status: DISCONTINUED | OUTPATIENT
Start: 2020-09-08 | End: 2020-09-08 | Stop reason: HOSPADM

## 2020-09-08 RX ORDER — FENTANYL CITRATE 50 UG/ML
INJECTION, SOLUTION INTRAMUSCULAR; INTRAVENOUS
Status: DISCONTINUED
Start: 2020-09-08 | End: 2020-09-08 | Stop reason: HOSPADM

## 2020-09-08 RX ORDER — FENTANYL CITRATE 50 UG/ML
25 INJECTION, SOLUTION INTRAMUSCULAR; INTRAVENOUS EVERY 5 MIN PRN
Status: DISCONTINUED | OUTPATIENT
Start: 2020-09-08 | End: 2020-09-08 | Stop reason: HOSPADM

## 2020-09-08 RX ORDER — PROPOFOL 10 MG/ML
INJECTION, EMULSION INTRAVENOUS PRN
Status: DISCONTINUED | OUTPATIENT
Start: 2020-09-08 | End: 2020-09-08 | Stop reason: SDUPTHER

## 2020-09-08 RX ADMIN — ROCURONIUM BROMIDE 10 MG: 10 SOLUTION INTRAVENOUS at 10:15

## 2020-09-08 RX ADMIN — ONDANSETRON 4 MG: 2 INJECTION INTRAMUSCULAR; INTRAVENOUS at 12:05

## 2020-09-08 RX ADMIN — FENTANYL CITRATE 50 MCG: 50 INJECTION, SOLUTION INTRAMUSCULAR; INTRAVENOUS at 12:30

## 2020-09-08 RX ADMIN — CEFAZOLIN 3 G: 10 INJECTION, POWDER, FOR SOLUTION INTRAVENOUS at 10:18

## 2020-09-08 RX ADMIN — Medication 200 MG: at 10:16

## 2020-09-08 RX ADMIN — DEXAMETHASONE SODIUM PHOSPHATE 10 MG: 10 INJECTION, SOLUTION INTRAMUSCULAR; INTRAVENOUS at 10:18

## 2020-09-08 RX ADMIN — PROPOFOL INJECTABLE EMULSION 50 MG: 10 INJECTION, EMULSION INTRAVENOUS at 12:02

## 2020-09-08 RX ADMIN — PROPOFOL INJECTABLE EMULSION 300 MG: 10 INJECTION, EMULSION INTRAVENOUS at 10:15

## 2020-09-08 RX ADMIN — PROPOFOL INJECTABLE EMULSION 50 MG: 10 INJECTION, EMULSION INTRAVENOUS at 11:50

## 2020-09-08 RX ADMIN — LIDOCAINE HYDROCHLORIDE 100 MG: 20 INJECTION, SOLUTION INTRAVENOUS at 10:15

## 2020-09-08 RX ADMIN — FENTANYL CITRATE 50 MCG: 50 INJECTION, SOLUTION INTRAMUSCULAR; INTRAVENOUS at 11:05

## 2020-09-08 RX ADMIN — MEPERIDINE HYDROCHLORIDE 25 MG: 25 INJECTION INTRAMUSCULAR; INTRAVENOUS; SUBCUTANEOUS at 12:40

## 2020-09-08 RX ADMIN — SODIUM CHLORIDE, POTASSIUM CHLORIDE, SODIUM LACTATE AND CALCIUM CHLORIDE: 600; 310; 30; 20 INJECTION, SOLUTION INTRAVENOUS at 10:06

## 2020-09-08 RX ADMIN — ROCURONIUM BROMIDE 40 MG: 10 SOLUTION INTRAVENOUS at 10:18

## 2020-09-08 RX ADMIN — SODIUM CHLORIDE, POTASSIUM CHLORIDE, SODIUM LACTATE AND CALCIUM CHLORIDE: 600; 310; 30; 20 INJECTION, SOLUTION INTRAVENOUS at 08:01

## 2020-09-08 RX ADMIN — FENTANYL CITRATE 50 MCG: 50 INJECTION, SOLUTION INTRAMUSCULAR; INTRAVENOUS at 12:35

## 2020-09-08 RX ADMIN — FENTANYL CITRATE 100 MCG: 50 INJECTION, SOLUTION INTRAMUSCULAR; INTRAVENOUS at 10:35

## 2020-09-08 RX ADMIN — FENTANYL CITRATE 100 MCG: 50 INJECTION, SOLUTION INTRAMUSCULAR; INTRAVENOUS at 10:15

## 2020-09-08 RX ADMIN — MEPERIDINE HYDROCHLORIDE 25 MG: 25 INJECTION INTRAMUSCULAR; INTRAVENOUS; SUBCUTANEOUS at 12:45

## 2020-09-08 ASSESSMENT — PAIN DESCRIPTION - PAIN TYPE
TYPE: SURGICAL PAIN

## 2020-09-08 ASSESSMENT — PULMONARY FUNCTION TESTS
PIF_VALUE: 29
PIF_VALUE: 25
PIF_VALUE: 26
PIF_VALUE: 28
PIF_VALUE: 26
PIF_VALUE: 2
PIF_VALUE: 25
PIF_VALUE: 26
PIF_VALUE: 0
PIF_VALUE: 26
PIF_VALUE: 28
PIF_VALUE: 0
PIF_VALUE: 26
PIF_VALUE: 29
PIF_VALUE: 28
PIF_VALUE: 26
PIF_VALUE: 25
PIF_VALUE: 13
PIF_VALUE: 25
PIF_VALUE: 26
PIF_VALUE: 23
PIF_VALUE: 25
PIF_VALUE: 26
PIF_VALUE: 25
PIF_VALUE: 22
PIF_VALUE: 25
PIF_VALUE: 0
PIF_VALUE: 24
PIF_VALUE: 26
PIF_VALUE: 26
PIF_VALUE: 1
PIF_VALUE: 21
PIF_VALUE: 31
PIF_VALUE: 25
PIF_VALUE: 26
PIF_VALUE: 25
PIF_VALUE: 26
PIF_VALUE: 24
PIF_VALUE: 25
PIF_VALUE: 27
PIF_VALUE: 25
PIF_VALUE: 30
PIF_VALUE: 26
PIF_VALUE: 25
PIF_VALUE: 26
PIF_VALUE: 26
PIF_VALUE: 36
PIF_VALUE: 0
PIF_VALUE: 26
PIF_VALUE: 25
PIF_VALUE: 25
PIF_VALUE: 22
PIF_VALUE: 25
PIF_VALUE: 25
PIF_VALUE: 24
PIF_VALUE: 26
PIF_VALUE: 25
PIF_VALUE: 24
PIF_VALUE: 25
PIF_VALUE: 29
PIF_VALUE: 25
PIF_VALUE: 30
PIF_VALUE: 25
PIF_VALUE: 25
PIF_VALUE: 23
PIF_VALUE: 26
PIF_VALUE: 23
PIF_VALUE: 25
PIF_VALUE: 25
PIF_VALUE: 26
PIF_VALUE: 26
PIF_VALUE: 25
PIF_VALUE: 31
PIF_VALUE: 26
PIF_VALUE: 24
PIF_VALUE: 25
PIF_VALUE: 32
PIF_VALUE: 26
PIF_VALUE: 25
PIF_VALUE: 30
PIF_VALUE: 28
PIF_VALUE: 30
PIF_VALUE: 31
PIF_VALUE: 25
PIF_VALUE: 26
PIF_VALUE: 7
PIF_VALUE: 0
PIF_VALUE: 4
PIF_VALUE: 30
PIF_VALUE: 25
PIF_VALUE: 25
PIF_VALUE: 29
PIF_VALUE: 24
PIF_VALUE: 25
PIF_VALUE: 30
PIF_VALUE: 25
PIF_VALUE: 22
PIF_VALUE: 24
PIF_VALUE: 24
PIF_VALUE: 26
PIF_VALUE: 29
PIF_VALUE: 26
PIF_VALUE: 4
PIF_VALUE: 26
PIF_VALUE: 26
PIF_VALUE: 30
PIF_VALUE: 31
PIF_VALUE: 23
PIF_VALUE: 26
PIF_VALUE: 28
PIF_VALUE: 25
PIF_VALUE: 26
PIF_VALUE: 22
PIF_VALUE: 23
PIF_VALUE: 26
PIF_VALUE: 21
PIF_VALUE: 21
PIF_VALUE: 30
PIF_VALUE: 0

## 2020-09-08 ASSESSMENT — PAIN DESCRIPTION - FREQUENCY
FREQUENCY: CONTINUOUS
FREQUENCY: INTERMITTENT
FREQUENCY: CONTINUOUS

## 2020-09-08 ASSESSMENT — PAIN SCALES - GENERAL
PAINLEVEL_OUTOF10: 5
PAINLEVEL_OUTOF10: 6
PAINLEVEL_OUTOF10: 4
PAINLEVEL_OUTOF10: 2
PAINLEVEL_OUTOF10: 5
PAINLEVEL_OUTOF10: 10
PAINLEVEL_OUTOF10: 10
PAINLEVEL_OUTOF10: 0
PAINLEVEL_OUTOF10: 5

## 2020-09-08 ASSESSMENT — PAIN - FUNCTIONAL ASSESSMENT
PAIN_FUNCTIONAL_ASSESSMENT: 0-10
PAIN_FUNCTIONAL_ASSESSMENT: PREVENTS OR INTERFERES SOME ACTIVE ACTIVITIES AND ADLS

## 2020-09-08 ASSESSMENT — PAIN DESCRIPTION - PROGRESSION
CLINICAL_PROGRESSION: GRADUALLY IMPROVING
CLINICAL_PROGRESSION: NOT CHANGED
CLINICAL_PROGRESSION: NOT CHANGED
CLINICAL_PROGRESSION: RAPIDLY WORSENING

## 2020-09-08 ASSESSMENT — PAIN DESCRIPTION - ORIENTATION
ORIENTATION: RIGHT

## 2020-09-08 ASSESSMENT — PAIN DESCRIPTION - DESCRIPTORS
DESCRIPTORS: ACHING;BURNING;CONSTANT;THROBBING
DESCRIPTORS: ACHING
DESCRIPTORS: ACHING;THROBBING
DESCRIPTORS: ACHING;THROBBING
DESCRIPTORS: ACHING;BURNING;CONSTANT;THROBBING

## 2020-09-08 ASSESSMENT — PAIN DESCRIPTION - ONSET
ONSET: GRADUAL
ONSET: ON-GOING

## 2020-09-08 ASSESSMENT — PAIN DESCRIPTION - LOCATION
LOCATION: ARM
LOCATION: ELBOW
LOCATION: ARM

## 2020-09-08 NOTE — H&P
History and Physical      CHIEF COMPLAINT:  Tendon injury r elbow    HISTORY OF PRESENT ILLNESS:      lifing incident    Past Medical History:        Diagnosis Date    Hyperlipidemia     Hypertension      Past Surgical History:        Procedure Laterality Date    CARPAL TUNNEL RELEASE      bilateral    FRACTURE SURGERY      left knee     Social History:    TOBACCO:   reports that he quit smoking about 10 years ago. His smoking use included cigarettes. He has a 5.00 pack-year smoking history. He uses smokeless tobacco.  ETOH:   reports current alcohol use. DRUGS:   reports no history of drug use. Family History:       Problem Relation Age of Onset    Diabetes Mother     High Cholesterol Mother     Cancer Father 46        Colon cancer    Coronary Art Dis Father     Diabetes Father     High Cholesterol Father     High Blood Pressure Sister     Atrial Fibrillation Maternal Grandfather     Heart Attack Maternal Grandfather     Diabetes Paternal Grandmother     Cancer Paternal Grandmother      Medications Prior to Admission:  Medications Prior to Admission: lisinopril (PRINIVIL;ZESTRIL) 20 MG tablet, Take 1 tablet by mouth daily  atorvastatin (LIPITOR) 10 MG tablet, Take 1 tablet by mouth daily (Patient taking differently: Take 10 mg by mouth nightly )  hydroCHLOROthiazide (HYDRODIURIL) 25 MG tablet, TAKE 1 TABLET BY MOUTH DAILY  hydrocortisone 2.5 % cream, Apply topically 2 times daily. ibuprofen (ADVIL;MOTRIN) 800 MG tablet, Take 1 tablet by mouth 3 times daily (with meals)  Allergies:  Patient has no known allergies.     CONSTITUTIONAL:  negative for  chills and anorexia  HEENT:  negative for  tinnitus  RESPIRATORY:  negative for  dyspnea and cyanosis  CARDIOVASCULAR:  negative for  palpitations  GASTROINTESTINAL:  negative for vomiting and hematemesis  GENITOURINARY:  negative for hematuria  ENDOCRINE:  negative for tremor  MUSCULOSKELETAL:  positive for  decreased range of motion and muscle weakness  NEUROLOGICAL:  negative for seizures and syncope  BEHAVIOR/PSYCH:  negative for agitated and anxiety    PHYSICAL EXAM:  Ht 5' 7\" (1.702 m)   Wt (!) 418 lb (189.6 kg)   BMI 65.47 kg/m²   General appearance:  awake, alert, cooperative, no apparent distress, and appears stated age  Neurologic:  Awake, alert, oriented to name, place and time. Cranial nerves II-XII are grossly intact. Motor is 5 out of 5 bilaterally. Cerebellar finger to nose, heel to shin intact. Sensory is intact.   Babinski down going, Romberg negative, and gait is normal.  Lungs:  No increased work of breathing, good air exchange, clear to auscultation bilaterally, no crackles or wheezing  Heart:  Normal apical impulse, regular rate and rhythm, normal S1 and S2, no S3 or S4, and no murmur noted  Abdomen:  normal bowel sounds  Skin: warm and dry, no rash or erythema  ENT: tympanic membrane, external ear and ear canal normal bilaterally, oropharynx clear and moist with normal mucous membranes  Musculoskeletal: RUE in sling , johnson allowed ROM    General Labs:  CBC:   Lab Results   Component Value Date    WBC 7.7 09/01/2020    RBC 5.67 09/01/2020    HGB 14.8 09/01/2020    HCT 48.3 09/01/2020    MCV 85.2 09/01/2020    RDW 13.8 09/01/2020     09/01/2020     CMP:    Lab Results   Component Value Date     09/01/2020    K 4.9 09/01/2020     09/01/2020    CO2 26 09/01/2020    BUN 14 09/01/2020    PROT 7.6 09/01/2020     U/A:  No components found for: Pearl Lulu, Anat Mishrao, UP, [place], UKETONE, UBILI, UBLOOD, UNITRITE, UUROBIL, [place], QEPI, [place], [facility], [place], [facility], [place]    Radiology: rupture distal biceps tendon R    ASSESSMENT AND PLAN:    repair      Electronically signed by Tu Howell DO on 9/8/2020 at 7:11 AM

## 2020-09-08 NOTE — ANESTHESIA POSTPROCEDURE EVALUATION
Department of Anesthesiology  Postprocedure Note    Patient: Adriana Arshad  MRN: [id number]  YOB: 1985  Date of evaluation: 9/8/2020  Time:  3:00 PM     Procedure Summary     Date:  09/08/20 Room / Location:  [address] [place] [address] / [address] [place] [address]    Anesthesia Start:  1006 Anesthesia Stop:  8754    Procedure:  DISTAL BICEPS TENDON RUPTURE REPAIR-RIGHT (Right ) Diagnosis:  (DISTAL BICEPS RUPTURE-RIGHT)    Surgeon:  Vic Landry DO Responsible Provider:  Mara Grant MD    Anesthesia Type:  general ASA Status:  3          Anesthesia Type: general    West Phase I: West Score: 10    West Phase II: West Score: 10    Last vitals: Reviewed and per EMR flowsheets.        Anesthesia Post Evaluation    Patient location during evaluation: PACU  Patient participation: complete - patient participated  Level of consciousness: awake  Pain score: 0  Airway patency: patent  Nausea & Vomiting: no nausea  Complications: no  Cardiovascular status: blood pressure returned to baseline  Respiratory status: acceptable  Hydration status: euvolemic

## 2020-09-08 NOTE — ANESTHESIA PRE PROCEDURE
Department of Anesthesiology  Preprocedure Note       Name:  Abhinav Benoit   Age:  37 y.o.  :  1985                                          MRN:  [id number]         Date:  2020      Surgeon: Felecia Mccullough):  ASHWINI Stern DO    Procedure: Procedure(s):  DISTAL BICEPS TENDON RUPTURE REPAIR-RIGHT    Medications prior to admission:   Prior to Admission medications    Medication Sig Start Date End Date Taking? Authorizing Provider   lisinopril (PRINIVIL;ZESTRIL) 20 MG tablet Take 1 tablet by mouth daily 20  Yes Cesar Thakkar MD   atorvastatin (LIPITOR) 10 MG tablet Take 1 tablet by mouth daily  Patient taking differently: Take 10 mg by mouth nightly  20  Yes Cesar Thakkar MD   hydroCHLOROthiazide (HYDRODIURIL) 25 MG tablet TAKE 1 TABLET BY MOUTH DAILY 4/3/20  Yes Cesar Thakkar MD   hydrocortisone 2.5 % cream Apply topically 2 times daily.  19  Yes TRACY Campa   ibuprofen (ADVIL;MOTRIN) 800 MG tablet Take 1 tablet by mouth 3 times daily (with meals) 3/23/19  Yes Cesar Thakkar MD       Current medications:    Current Facility-Administered Medications   Medication Dose Route Frequency Provider Last Rate Last Dose    lactated ringers infusion   Intravenous Continuous Manuel Le  mL/hr at 20 0801      sodium chloride flush 0.9 % injection 10 mL  10 mL Intravenous PRN Manuel Le MD        ceFAZolin (ANCEF) 1 g in sterile water 10 mL IV syringe  1 g Intravenous On Call to Ale Rodarte, DO           Allergies:  No Known Allergies    Problem List:    Patient Active Problem List   Diagnosis Code    Tobacco abuse Z72.0    Essential hypertension I10    Skin tags, multiple acquired L91.8    MADDIE (obstructive sleep apnea) G47.33       Past Medical History:        Diagnosis Date    Hyperlipidemia     Hypertension        Past Surgical History:        Procedure Laterality Date    CARPAL TUNNEL RELEASE      bilateral    FRACTURE SURGERY Tests: No results for input(s): POCGLU, POCNA, POCK, POCCL, POCBUN, POCHEMO, POCHCT in the last 72 hours. Coags: No results found for: PROTIME, INR, APTT    HCG (If Applicable): No results found for: PREGTESTUR, PREGSERUM, HCG, HCGQUANT     ABGs: No results found for: PHART, PO2ART, YQN3KMZ, RJZ8CTJ, BEART, [id number]     Type & Screen (If Applicable):  No results found for: LABABO, LABRH    Drug/Infectious Status (If Applicable):  No results found for: HIV, HEPCAB    COVID-19 Screening (If Applicable): No results found for: COVID19      Anesthesia Evaluation    Airway: Mallampati: III  TM distance: >3 FB   Neck ROM: full  Mouth opening: > = 3 FB Dental: normal exam         Pulmonary: breath sounds clear to auscultation  (+) sleep apnea: on noncompliant,                             Cardiovascular:    (+) hypertension:,         Rhythm: regular  Rate: normal           Beta Blocker:  Not on Beta Blocker         Neuro/Psych:               GI/Hepatic/Renal: Neg GI/Hepatic/Renal ROS            Endo/Other: Negative Endo/Other ROS                    Abdominal:   (+) obese,     Abdomen: soft. Vascular:                                        Anesthesia Plan      general     ASA 3       Induction: intravenous. Anesthetic plan and risks discussed with patient. Plan discussed with CRNA.                   Maryuri Lujan MD   9/8/2020

## 2020-09-08 NOTE — PROGRESS NOTES
Dr Darryle Butte spoke with Pt's wife after surgery. Pt is able to move shoulder only. Not to use arm.   Jamarcus Lugo   9/8/2020  [id number]

## 2020-09-11 RX ORDER — IBUPROFEN 800 MG/1
800 TABLET ORAL
Qty: 90 TABLET | Refills: 5 | Status: SHIPPED
Start: 2020-09-11 | End: 2021-11-29

## 2020-09-18 ENCOUNTER — OFFICE VISIT (OUTPATIENT)
Dept: ORTHOPEDIC SURGERY | Age: 35
End: 2020-09-18

## 2020-09-18 VITALS — BODY MASS INDEX: 49.44 KG/M2 | TEMPERATURE: 98 F | WEIGHT: 315 LBS | HEIGHT: 67 IN

## 2020-09-18 PROCEDURE — 99024 POSTOP FOLLOW-UP VISIT: CPT | Performed by: ORTHOPAEDIC SURGERY

## 2020-09-18 NOTE — PROGRESS NOTES
Chief Complaint:   Chief Complaint   Patient presents with    Arm Pain     Right Distal Bicep Tendon Repair DOS 9/8/2020       Vinnie Nolen is 10 days postop right distal biceps tendon repair. He is doing well. He does not complain of any numbness. He has a little burning in the forearm which she feels is from disuse. He is in a splint and sling he has not been using the arm. Allergies; medications; past medical, surgical, family, and social history; and problem list have been reviewed today and updated as indicated in this encounter seen below. Exam: The splint was removed and the wounds look good they are well approximated with Steri-Strips in place. There is no bleeding. He has good sensation throughout the forearm. Good finger motion is noted. He does not seem to have any pain with elbow extension to about 60 degrees. Radiographs: Intraoperative films were shown to the patient for interest sake. ASSESSMENT:    Debra Skelton was seen today for arm pain. Diagnoses and all orders for this visit:    Biceps rupture, distal, right, initial encounter        PLAN: The splint was removed. He will stay in the sling and he was encouraged to not use the arm for any purposeful tasks. Gentle range of motion is fine and he can shower it. Will follow in 3 weeks. Return in about 3 weeks (around 10/9/2020). Current Outpatient Medications   Medication Sig Dispense Refill    ibuprofen (ADVIL;MOTRIN) 800 MG tablet Take 1 tablet by mouth 3 times daily (with meals) 90 tablet 5    lisinopril (PRINIVIL;ZESTRIL) 20 MG tablet Take 1 tablet by mouth daily 90 tablet 1    atorvastatin (LIPITOR) 10 MG tablet Take 1 tablet by mouth daily (Patient taking differently: Take 10 mg by mouth nightly ) 90 tablet 1    hydroCHLOROthiazide (HYDRODIURIL) 25 MG tablet TAKE 1 TABLET BY MOUTH DAILY 90 tablet 3    hydrocortisone 2.5 % cream Apply topically 2 times daily.  20 g 0     No current facility-administered

## 2020-09-23 ENCOUNTER — OFFICE VISIT (OUTPATIENT)
Dept: ORTHOPEDIC SURGERY | Age: 35
End: 2020-09-23

## 2020-09-23 VITALS — HEIGHT: 67 IN | BODY MASS INDEX: 49.44 KG/M2 | WEIGHT: 315 LBS | TEMPERATURE: 98 F

## 2020-09-23 PROCEDURE — 99024 POSTOP FOLLOW-UP VISIT: CPT | Performed by: ORTHOPAEDIC SURGERY

## 2020-09-23 NOTE — PROGRESS NOTES
Chief Complaint:   Chief Complaint   Patient presents with    Elbow Pain     Right Elbow, Bicep Tendon Repair DOS 9/8/2020, states of hard lump next to incisions site x 2 days       Masoud Fong is 15 days postop right distal biceps tendon repair. He is noticed firms swelling in his distal incision area on the ulnar side. He is little concerned that there may been something that went wrong in this. He has been moving his arm around gently and doing nothing strenuous. He does have a little discomfort occasionally some muscle spasms. Allergies; medications; past medical, surgical, family, and social history; and problem list have been reviewed today and updated as indicated in this encounter seen below. Exam: The wounds are healing well. There are no complications noted. There is a firmness along the ulnar side of the distal longitudinal incision in the forearm. The biceps distal tendon repair seems to be intact itself. He does have good range of motion of the elbow. There does not appear to be any retraction. Radiographs: None    ASSESSMENT:    Winnie Ingram was seen today for elbow pain. Diagnoses and all orders for this visit:    Biceps rupture, distal, right, initial encounter        PLAN: Careful gentle limited range of motion right elbow with no strenuous use. We will follow-up October 9 as previously scheduled. He will use his sling to decrease his tendency to use the arm. Return in about 16 days (around 10/9/2020).        Current Outpatient Medications   Medication Sig Dispense Refill    ibuprofen (ADVIL;MOTRIN) 800 MG tablet Take 1 tablet by mouth 3 times daily (with meals) 90 tablet 5    lisinopril (PRINIVIL;ZESTRIL) 20 MG tablet Take 1 tablet by mouth daily 90 tablet 1    atorvastatin (LIPITOR) 10 MG tablet Take 1 tablet by mouth daily (Patient taking differently: Take 10 mg by mouth nightly ) 90 tablet 1    hydroCHLOROthiazide (HYDRODIURIL) 25 MG tablet TAKE 1 TABLET BY

## 2020-10-09 ENCOUNTER — OFFICE VISIT (OUTPATIENT)
Dept: ORTHOPEDIC SURGERY | Age: 35
End: 2020-10-09

## 2020-10-09 VITALS — BODY MASS INDEX: 49.44 KG/M2 | TEMPERATURE: 98 F | HEIGHT: 67 IN | WEIGHT: 315 LBS

## 2020-10-09 PROCEDURE — 99024 POSTOP FOLLOW-UP VISIT: CPT | Performed by: ORTHOPAEDIC SURGERY

## 2020-10-09 NOTE — PROGRESS NOTES
Chief Complaint:   Chief Complaint   Patient presents with    Post-Op Check     Right elbow bicept tendon repair DOS 9/8/2020       Santino Marroquin is 31 days postop right distal biceps tendon repair. He is doing pretty well. He gets little soreness in his forearm muscles. He does not have any numbness. He has good range of motion his elbow. He is careful about his activities and uses a sling to discourage himself from doing too much. He did ask about returning to work and when it might be. He is a  and has to climb to get into the cabin of the [employer]. Allergies; medications; past medical, surgical, family, and social history; and problem list have been reviewed today and updated as indicated in this encounter seen below. Exam: Skin condition gross neurovascular function good in right upper extremity. His wounds are well-healed. He has prominence of the distal biceps tendon. He has range of motion which is 0 to 110 degrees or more flexion of the elbow. Rotation and supination causes some discomfort. Radiographs: None    ASSESSMENT:    Leta Hunt was seen today for post-op check. Diagnoses and all orders for this visit:    Biceps rupture, distal, right, initial encounter        PLAN: Continue limited activity with the right upper extremity. We will follow-up in about 4 weeks. He needs to talk with his employer to see if there is any light duty work available. We will    Return in about 4 weeks (around 11/6/2020).        Current Outpatient Medications   Medication Sig Dispense Refill    ibuprofen (ADVIL;MOTRIN) 800 MG tablet Take 1 tablet by mouth 3 times daily (with meals) 90 tablet 5    lisinopril (PRINIVIL;ZESTRIL) 20 MG tablet Take 1 tablet by mouth daily 90 tablet 1    atorvastatin (LIPITOR) 10 MG tablet Take 1 tablet by mouth daily (Patient taking differently: Take 10 mg by mouth nightly ) 90 tablet 1    hydroCHLOROthiazide (HYDRODIURIL) 25 MG tablet TAKE 1 TABLET BY Intimate partner violence     Fear of current or ex partner: None     Emotionally abused: None     Physically abused: None     Forced sexual activity: None   Other Topics Concern    None   Social History Narrative    None       Review of Systems  As follows except as previously noted in HPI:  Constitutional: Negative for chills, diaphoresis, fatigue, fever and unexpected weight change. Respiratory: Negative for cough, shortness of breath and wheezing. Cardiovascular: Negative for chest pain and palpitations. Neurological: Negative for dizziness, syncope, cephalgia. GI / : negative  Musculoskeletal: see HPI       Objective:   Physical Exam   Constitutional: Oriented to person, place, and time. and appears well-developed and well-nourished. :   Head: Normocephalic and atraumatic. Eyes: EOM are normal.   Neck: Neck supple. Cardiovascular: Normal rate and regular rhythm. Pulmonary/Chest: Effort normal. No stridor. No respiratory distress, no wheezes. Abdominal:  No abnormal distension. Neurological: Alert and oriented to person, place, and time. Skin: Skin is warm and dry. Psychiatric: Normal mood and affect.  Behavior is normal. Thought content normal.    ASHWINI Ga DO    10/9/20  10:16 AM

## 2020-11-06 ENCOUNTER — OFFICE VISIT (OUTPATIENT)
Dept: ORTHOPEDIC SURGERY | Age: 35
End: 2020-11-06

## 2020-11-06 VITALS — BODY MASS INDEX: 49.44 KG/M2 | WEIGHT: 315 LBS | HEIGHT: 67 IN

## 2020-11-06 PROCEDURE — 99024 POSTOP FOLLOW-UP VISIT: CPT | Performed by: ORTHOPAEDIC SURGERY

## 2020-11-06 NOTE — PROGRESS NOTES
encounter       Past Medical History:   Diagnosis Date    Hyperlipidemia     Hypertension        Past Surgical History:   Procedure Laterality Date    ARM SURGERY Right 9/8/2020    DISTAL BICEPS TENDON RUPTURE REPAIR-RIGHT performed by Colette Roberson DO at [address] [facility], [address]      bilateral    FRACTURE SURGERY      left knee       No Known Allergies    Social History     Socioeconomic History    Marital status:      Spouse name: None    Number of children: None    Years of education: None    Highest education level: None   Occupational History    None   Social Needs    Financial resource strain: None    Food insecurity     Worry: None     Inability: None    Transportation needs     Medical: None     Non-medical: None   Tobacco Use    Smoking status: Former Smoker     Packs/day: 0.50     Years: 10.00     Pack years: 5.00     Types: Cigarettes     Last attempt to quit: 1/1/2010     Years since quitting: 10.8    Smokeless tobacco: Current User    Tobacco comment: patient quit   Substance and Sexual Activity    Alcohol use: Yes     Comment: occasionally    Drug use: No    Sexual activity: Yes     Partners: Female     Comment: 1 partner -  7 years    Lifestyle    Physical activity     Days per week: None     Minutes per session: None    Stress: None   Relationships    Social connections     Talks on phone: None     Gets together: None     Attends Tenriism service: None     Active member of club or organization: None     Attends meetings of clubs or organizations: None     Relationship status: None    Intimate partner violence     Fear of current or ex partner: None     Emotionally abused: None     Physically abused: None     Forced sexual activity: None   Other Topics Concern    None   Social History Narrative    None       Review of Systems  As follows except as previously noted in HPI:  Constitutional: Negative for chills, diaphoresis, fatigue, fever and unexpected weight change. Respiratory: Negative for cough, shortness of breath and wheezing. Cardiovascular: Negative for chest pain and palpitations. Neurological: Negative for dizziness, syncope, cephalgia. GI / : negative  Musculoskeletal: see HPI       Objective:   Physical Exam   Constitutional: Oriented to person, place, and time. and appears well-developed and well-nourished. :   Head: Normocephalic and atraumatic. Eyes: EOM are normal.   Neck: Neck supple. Cardiovascular: Normal rate and regular rhythm. Pulmonary/Chest: Effort normal. No stridor. No respiratory distress, no wheezes. Abdominal:  No abnormal distension. Neurological: Alert and oriented to person, place, and time. Skin: Skin is warm and dry. Psychiatric: Normal mood and affect.  Behavior is normal. Thought content normal.    ASHWINI Giles DO    11/6/20  9:48 AM

## 2020-12-04 ENCOUNTER — OFFICE VISIT (OUTPATIENT)
Dept: ORTHOPEDIC SURGERY | Age: 35
End: 2020-12-04

## 2020-12-04 VITALS — HEIGHT: 67 IN | WEIGHT: 315 LBS | BODY MASS INDEX: 49.44 KG/M2

## 2020-12-04 PROCEDURE — 99024 POSTOP FOLLOW-UP VISIT: CPT | Performed by: ORTHOPAEDIC SURGERY

## 2020-12-04 NOTE — PROGRESS NOTES
Chief Complaint:   Chief Complaint   Patient presents with    Arm Pain     Right distal bicep tendo repair follow up. DOS 9/8/2020       Roel Guo is a less than 3 months postop right biceps tendon repair distally. He is doing well. He has good range of motion in the elbow and has no discomfort. He is good forearm rotation as well. Allergies; medications; past medical, surgical, family, and social history; and problem list have been reviewed today and updated as indicated in this encounter seen below. Exam: The wounds are well-healed. He has good tendon prominence. It is active in flexion extension of the elbow as well as rotation of the wrist.  There is no crepitus and no discomfort noted. Radiographs: None    ASSESSMENT:    Richa Guevara was seen today for arm pain. Diagnoses and all orders for this visit:    Biceps rupture, distal, right, initial encounter        PLAN: We will schedule physical therapy to improve comfort function and strength gradually. He still to avoid any strenuous activities with this. We will follow-up in 4 weeks. Return in about 4 weeks (around 1/1/2021). Current Outpatient Medications   Medication Sig Dispense Refill    ibuprofen (ADVIL;MOTRIN) 800 MG tablet Take 1 tablet by mouth 3 times daily (with meals) 90 tablet 5    lisinopril (PRINIVIL;ZESTRIL) 20 MG tablet Take 1 tablet by mouth daily 90 tablet 1    atorvastatin (LIPITOR) 10 MG tablet Take 1 tablet by mouth daily (Patient taking differently: Take 10 mg by mouth nightly ) 90 tablet 1    hydroCHLOROthiazide (HYDRODIURIL) 25 MG tablet TAKE 1 TABLET BY MOUTH DAILY 90 tablet 3    hydrocortisone 2.5 % cream Apply topically 2 times daily. 20 g 0     No current facility-administered medications for this visit.         Patient Active Problem List   Diagnosis    Tobacco abuse    Essential hypertension    Skin tags, multiple acquired    MADDIE (obstructive sleep apnea)    Rupture of distal biceps tendon, right, initial encounter       Past Medical History:   Diagnosis Date    Hyperlipidemia     Hypertension        Past Surgical History:   Procedure Laterality Date    ARM SURGERY Right 9/8/2020    DISTAL BICEPS TENDON RUPTURE REPAIR-RIGHT performed by Nolvia Hernandez DO at [address]      bilateral    FRACTURE SURGERY      left knee       No Known Allergies    Social History     Socioeconomic History    Marital status:      Spouse name: None    Number of children: None    Years of education: None    Highest education level: None   Occupational History    None   Social Needs    Financial resource strain: None    Food insecurity     Worry: None     Inability: None    Transportation needs     Medical: None     Non-medical: None   Tobacco Use    Smoking status: Former Smoker     Packs/day: 0.50     Years: 10.00     Pack years: 5.00     Types: Cigarettes     Last attempt to quit: 1/1/2010     Years since quitting: 10.9    Smokeless tobacco: Current User    Tobacco comment: patient quit   Substance and Sexual Activity    Alcohol use: Yes     Comment: occasionally    Drug use: No    Sexual activity: Yes     Partners: Female     Comment: 1 partner -  7 years    Lifestyle    Physical activity     Days per week: None     Minutes per session: None    Stress: None   Relationships    Social connections     Talks on phone: None     Gets together: None     Attends Protestant service: None     Active member of club or organization: None     Attends meetings of clubs or organizations: None     Relationship status: None    Intimate partner violence     Fear of current or ex partner: None     Emotionally abused: None     Physically abused: None     Forced sexual activity: None   Other Topics Concern    None   Social History Narrative    None       Review of Systems  As follows except as previously noted in HPI:  Constitutional: Negative for chills, diaphoresis, fatigue, fever and unexpected weight change. Respiratory: Negative for cough, shortness of breath and wheezing. Cardiovascular: Negative for chest pain and palpitations. Neurological: Negative for dizziness, syncope, cephalgia. GI / : negative  Musculoskeletal: see HPI       Objective:   Physical Exam   Constitutional: Oriented to person, place, and time. and appears well-developed and well-nourished. :   Head: Normocephalic and atraumatic. Eyes: EOM are normal.   Neck: Neck supple. Cardiovascular: Normal rate and regular rhythm. Pulmonary/Chest: Effort normal. No stridor. No respiratory distress, no wheezes. Abdominal:  No abnormal distension. Neurological: Alert and oriented to person, place, and time. Skin: Skin is warm and dry. Psychiatric: Normal mood and affect.  Behavior is normal. Thought content normal.    ASHWINI Will DO    12/4/20  9:19 AM

## 2020-12-07 ENCOUNTER — EVALUATION (OUTPATIENT)
Dept: PHYSICAL THERAPY | Age: 35
End: 2020-12-07
Payer: COMMERCIAL

## 2020-12-07 PROCEDURE — 97110 THERAPEUTIC EXERCISES: CPT | Performed by: PHYSICAL THERAPIST

## 2020-12-07 PROCEDURE — 97162 PT EVAL MOD COMPLEX 30 MIN: CPT | Performed by: PHYSICAL THERAPIST

## 2020-12-07 NOTE — PROGRESS NOTES
Physical Therapy Daily Treatment Note    Date: 2020  Patient Name: Sammy Manuel  : 1985   MRN: [id number]  DOInjury: 2020  DOSx: 20 Open repair distal biceps tendon right elbow  Referring Provider: Fredis Ndiaye DO   [address] Diagnosis:      Diagnosis Orders   1. Rupture of distal biceps tendon, right, initial encounter         Outcome Measure: Quick Dash: 36% Impairment    S: See eval  O: Pt given written HEP  Time 0826-2697     Visit 1 Repeat outcome measure at mid point and end. Pain 0/10     ROM WNL at elbow, 140 active flex     Modalities            Manual                  Stretch      Table slides      Wall Walk Flex      Wall Walk ABD      Wall ER Stretch      Towel IR Stretch      Supine Stretch with Arms Behind Head            Exercise      UBE     NEXT     Pulleys - Flex      Pulleys - IR      Supine Wand Flex      Supine Wand Bench Press      Supine Wand ER/IR      Standing Wand IR      Standing Wand Scaption      Standing Wand Flex      Standing Wand ER/IR      Shrugs AROM       Pendulum Ex            Biceps Stretch  5 reps x 1 set with 10s holds     Pronation/Supination Stretch 5 reps x 1 set with 10s holds     Biceps Curl 10 reps x 1 set with 3s hold Add light weight    Triceps Curl  10 reps x 1 set with 3s hold Add light weight    Supination/Pronation 10 reps x 1 set with 3s hold Add light weight    Shoulder Flexion 10 reps x 1 set Add light weight          Supine Flex      S-lying ABD      S-lying ER            Prone Flexion      Prone Ext      Prone Row with ER      Prone Horizontal ABD            Standing Flex      Standing Scaption       Standing ABD      Standing Shoulder Press       Functional activities To aid in ROM and strength needed for reaching , lifting ,pushing and pulling at home/work    ROWS: H       ROWS: M  \"    ROWS: L  \"    ER  \"    IR  \"    Shoulder Flex      Shoulder ABD      A:  Tolerated well.

## 2020-12-07 NOTE — PROGRESS NOTES
[address] [facility] OUTPATIENT REHABILITATION  PHYSICAL THERAPY INITIAL EVALUATION         Date:  2020   Patient: Anneliese Francis  : 1985  MRN: [id number]  Referring Provider: Libra Liu DO  [facility] [address]     Medical Diagnosis:      Diagnosis Orders   1. Rupture of distal biceps tendon, right, initial encounter          SUBJECTIVE:     Onset date: 20    Onset[de-identified] Sudden     Mechanism of Injury / History: Pt was doing yard work and the tractor got stuck and pt went to manually lift tractor out of divot and tendon ruptured. Patient is right handed. Previous PT: none    Medical Management for Current Problem: OTC meds     Chief complaint: weakness    Behavior: condition is getting better    Pain: intermittent  Current: 0/10     Best: 0/10     Worst:4/10    Symptom Type/Quality: dull, aching  Location[de-identified] noted above distal biceps      Aggravated by: reaching overhead, reaching out, reaching behind back, lifting/carrying/material handling    Relieved by: rest, medication      Imaging results: No results found.     Past Medical History:  Past Medical History:   Diagnosis Date    Hyperlipidemia     Hypertension      Past Surgical History:   Procedure Laterality Date    ARM SURGERY Right 2020    DISTAL BICEPS TENDON RUPTURE REPAIR-RIGHT performed by Libra Liu DO at [facility]      bilateral    FRACTURE SURGERY      left knee       Medications:   Current Outpatient Medications   Medication Sig Dispense Refill    ibuprofen (ADVIL;MOTRIN) 800 MG tablet Take 1 tablet by mouth 3 times daily (with meals) 90 tablet 5    lisinopril (PRINIVIL;ZESTRIL) 20 MG tablet Take 1 tablet by mouth daily 90 tablet 1    atorvastatin (LIPITOR) 10 MG tablet Take 1 tablet by mouth daily (Patient taking differently: Take 10 mg by mouth nightly ) 90 tablet 1    hydroCHLOROthiazide (HYDRODIURIL) 25 MG tablet TAKE 1 TABLET BY MOUTH DAILY 90 tablet 3    hydrocortisone 2.5 % cream Apply topically 2 times daily. 20 g 0     No current facility-administered medications for this visit. 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 Physical demands include: . Status: full time. Exercise regimen: none    Hobbies: yard work, working around [employer], get back to regular job    Patient Goals: pain relief, return to prior activity, get back to normal    Precautions/Contraindications: none    OBJECTIVE:     Observations: well nourished male    Inspection: normal orthopedic exam.  Incision: edges approximated, no purulent drainage, no redness, no swelling, no tenderness and not hot to touch.                    Palpation: Tender to palpation distal biceps     Joint/Motion:  Right Shoulder:  AROM: 140° Forward elevation,  WNL° ER,  IR to WNL Biceps WNL  PROM: 145° Forward elevation,  WNL° ER,  WNL° IR    Left Shoulder:  AROM: 150° Forward elevation,  WNL° ER,  IR to WNL  PROM: 155° Forward elevation,  WNL° ER , WNL° IR     Strength:  Right Shoulder: Flexion 4/5,  Abduction 4/5, ER 4/5, IR 4/5 Biceps 4/5  Left Shoulder: Flexion 5/5,  Abduction 5/5, ER 5/5, IR 5/5 Biceps 5/5    Special Tests/Functional Screens:    [] Lupe-Viktor []+ / [] -  [] Mcnary's []+ / [] -   []  Phillipson's []+ / [] -    []GH drawer []+ / [] -    [] Bicep Load []+ / [] -   [] Crank []+ / [] -  [] Kirstie Fawellington []+ / [] -   [] Elbow Varus []+ / [] -  [] Neer's []+ / [] -      [] Speed's []+ / [] -   []Sulcus Sign []+ / [] -   [] Apprehension []+ / [] -   [] Bicep Load II []+ / [] -   [] [place] Harp []+ / [] -   [] Elbow Valgus []+ / [] -     [] Other: []+ / [] -         ASSESSMENT     Outcome Measure:   QuickDASH (Disorders of the Arm, Shoulder, and Hand) 36% disability    Problems:    Pain reported 4/10   ROM decreased   Strength decreased 4/5 RUE   Decreased functional ability with work, ADLs, use of right upper extremity, lifting, carrying    [x] There are no barriers affecting plan of care or recovery    [] Barriers to this patient's plan of care or recovery include. Domestic Concerns:  [x] No  [] Yes:    Short Term goals (3-4 weeks)   Decrease reported pain to 2/10   Increase ROM to WNL   Increase Strength to 4+/5    Able to perform/complete the following functions/tasks: Perform ADLs, hobbies, housework, job duties with less pain.  QuickDASH (Disorders of the Arm, Shoulder, and Hand) 25% disability    Long Term goals (6-8 weeks)   Decrease reported pain to 0-2/10   Increase ROM to WNL   Increase Strength to 5/5    Able to perform/complete the following functions/tasks: Perform ADLs, hobbies, housework, job duties with no/minimal pain.  QuickDASH 0-15% disability   Independent with Home Exercise Programs    Rehab Potential: [x] Good  [] Fair  [] Poor    PLAN       Treatment Plan:   [x] Therapeutic Exercise  [x] Therapeutic Activity  [x] Neuromuscular Re-education   [] Gait Training  [x] Balance Training  [] Aerobic conditioning  [x] Manual Therapy  [x] Massage/Fascial release   [] Work/Sport specific activities    [] Pain Neuroscience [x] Cold/hotpack  [] Vasocompression  [x] Electrical Stimulation  [] Lumbar/Cervical Traction  [x] Ultrasound   [] Iontophoresis: 4 mg/mL Dexamethasone Sodium Phosphate 40-80 mAmin        [x] Instruction in HEP      []  Medication allergies reviewed for use of Dexamethasone Sodium Phosphate 4mg/ml  with iontophoresis treatments. Patient is not allergic.       The following CPT codes are likely to be used in the care of this patient: 60170 PT Evaluation: Moderate Complexity , 15976 PT Re-Evaluation , 76617 Therapeutic Exercise , 99167 Neuromuscular Re-Education , 41041 Therapeutic Activities , 69943 Manual Therapy ,  Electrical Stimulation      Suggested Professional Referral: [x] No  [] Yes:     Patient Education:  [x] Plans/Goals, Risks/Benefits discussed  [x] Home exercise program  Method of Education: [x] Verbal  [x] Demo [x] Written  Comprehension of Education:  [x] Verbalizes understanding. [x] Demonstrates understanding. [] Needs Review. [] Demonstrates/verbalizes understanding of HEP/Ed previously given. Frequency:  1-2 days per week for 6-8 weeks    Patient understands diagnosis/prognosis and consents to treatment, plan and goals: [x] Yes    [] No     Thank you for the opportunity to work with your patient. If you have questions or comments, please contact me at [phone]; fax: [phone]. Electronically signed by: Viktor Sparks PT    Medicare Patients Only     Please sign Physician's Certification and return to: [address] [place] [address]  Dept: [phone]  Dept Fax: [phone] Certification / Comments     Frequency/Duration 1-2 days per week for 6-8 weeks. Certification period from 12/7/2020  to 03/06/2020. I have reviewed the Plan of Care established for skilled therapy services and certify that the services are required and that they will be provided while the patient is under my care.     Physician's Comments/Revisions:               Physician's Printed Name:                                           [de-identified] Signature:                                                               Date:

## 2020-12-09 ENCOUNTER — TREATMENT (OUTPATIENT)
Dept: PHYSICAL THERAPY | Age: 35
End: 2020-12-09
Payer: COMMERCIAL

## 2020-12-09 PROCEDURE — 97530 THERAPEUTIC ACTIVITIES: CPT

## 2020-12-09 PROCEDURE — 97110 THERAPEUTIC EXERCISES: CPT

## 2020-12-09 NOTE — PROGRESS NOTES
Physical Therapy Daily Treatment Note    Date: 2020  Patient Name: Roel Guo  : 1985   MRN: [id number]  DOInjury: 2020  DOSx: 20 Open repair distal biceps tendon right elbow  Referring Provider:   Layla Owens DO  [address] Diagnosis:    Diagnosis Orders   1. Rupture of distal biceps tendon, right, initial encounter       Outcome Measure: Quick Dash: 36% Impairment    S: Patient had no complaints and still feels good. O: Pt given written HEP  Time [id number]     Visit 2 Repeat outcome measure at mid point and end.     Pain 0/10     ROM WNL at elbow, 140 active flex     Modalities            Manual                  Stretch      Table slides      Wall Walk Flex      Wall Walk ABD      Wall ER Stretch      Towel IR Stretch      Supine Stretch with Arms Behind Head            Exercise      UBE     3/3  TE   Pulleys - Flex      Pulleys - IR      Supine Wand Flex      Supine Wand Bench Press      Supine Wand ER/IR      Standing Wand IR      Standing Wand Scaption      Standing Wand Flex      Standing Wand ER/IR      Shrugs AROM       Pendulum Ex            Biceps Stretch  5 reps x 1 set with 10s holds  TE   Pronation/Supination Stretch 5 reps x 1 set with 10s holds  TE   Biceps Curl 10 reps x 2 sets with 3s hold  TE   Triceps Curl  2# 10 reps x 2 set with 3s hold  TE   Supination/Pronation 2# 10 reps x 2 set2 with 3s hold  TE   Shoulder Flexion 2# 10 reps x 2 set2  TE         Supine Flex      S-lying ABD      S-lying ER            Prone Flexion      Prone Ext      Prone Row with ER      Prone Horizontal ABD            Standing Flex      Standing Scaption       Standing ABD      Standing Shoulder Press       Functional activities To aid in ROM and strength needed for reaching , lifting ,pushing and pulling at home/work    ROWS: H 2 x 15 green  \" TA   ROWS: M 2 x 15 green \" TA   ROWS: L 2 x 15 green \" TA   ER  \"    IR  \"    Shoulder Flex Shoulder ABD      A:  Tolerated added weight and resistive bands well. Above added to written HEP and provided green theraband.   P: Continue with rehab plan  Robert Mccallum PTA    Treatment Charges: Mins Units   Initial Evaluation     Re-Evaluation     Ther Exercise         TE 20 1   Manual Therapy     MT     Ther Activities        TA 13 1   Gait Training          GT     Neuro Re-education NR     Modalities     Non-Billable Service Time     Other     Total Time/Units 33 2

## 2020-12-14 ENCOUNTER — TREATMENT (OUTPATIENT)
Dept: PHYSICAL THERAPY | Age: 35
End: 2020-12-14
Payer: COMMERCIAL

## 2020-12-14 PROCEDURE — 97110 THERAPEUTIC EXERCISES: CPT | Performed by: PHYSICAL THERAPIST

## 2020-12-14 NOTE — PROGRESS NOTES
Physical Therapy Daily Treatment Note    Date: 2020  Patient Name: Alexi Valladares  : 1985   MRN: [id number]  DOInjury: 2020  DOSx: 20 Open repair distal biceps tendon right elbow  Referring Provider:   Christal Cleaning DO  [address] [place] [address], [facility] Diagnosis:    Diagnosis Orders   1. Rupture of distal biceps tendon, right, initial encounter       Outcome Measure: Quick Dash: 36% Impairment    S: Patient had no complaints and still feels good with no pain. Pt stated that he felt new exercises and they were adequately challenging but not painful. O: Pt given written HEP  Time [id number]     Visit 3 Repeat outcome measure at mid point and end.     Pain 0/10     ROM WNL at elbow, 140 active flex     Modalities            Manual                  Stretch      Table slides      Wall Walk Flex      Wall Walk ABD      Wall ER Stretch      Towel IR Stretch      Supine Stretch with Arms Behind Head            Exercise      UBE     3/3  TE   Pulleys - Flex      Pulleys - IR      Supine Wand Flex      Supine Wand Bench Press      Supine Wand ER/IR      Standing Wand IR      Standing Wand Scaption      Standing Wand Flex      Standing Wand ER/IR      Shrugs AROM       Pendulum Ex            Biceps Stretch  5 reps x 1 set with 10s holds  TE   Pronation/Supination Stretch 5 reps x 1 set with 10s holds  TE   Biceps Curl 4# 20 reps x 1 set  TE   Triceps Curl  4# 20 reps x 1 set  TE   Supination/Pronation 4# 20 reps x 1 set  TE   Shoulder Flexion 4# 20 reps x 1 set  TE         Supine Flex      S-lying ABD      S-lying ER            Prone Flexion      Prone Ext      Prone Row with ER      Prone Horizontal ABD            Standing Flex      Standing Scaption       Standing ABD      Standing Shoulder Press       Functional activities To aid in ROM and strength needed for reaching , lifting ,pushing and pulling at home/work    ROWS: H 20 reps x 1 set BLUE  \" TA ROWS: M 20 reps x 1 set BLUE \" TA   ROWS: L 20 reps x 1 set BLUE \" TA   ER 20 reps x 1 set GREEN 2 bands    IR 20 reps x 1 set GREEN 2 bands    Biceps Curl 20 reps x 1 set GREEN     Shoulder Flex 20 reps x 1 set GREEN     Shoulder ABD 20 reps x 1 set GREEN     A:  Tolerated added weight and resistive bands well. Pt tolerated increase in weights well without additional pain and his endurance is improving.    P: Continue with rehab plan  Gabe Borja PT    Treatment Charges: Mins Units   Initial Evaluation     Re-Evaluation     Ther Exercise         TE 45 3   Manual Therapy     MT     Ther Activities        TA     Gait Training          GT     Neuro Re-education NR     Modalities     Non-Billable Service Time     Other     Total Time/Units 45 3

## 2020-12-17 ENCOUNTER — TELEPHONE (OUTPATIENT)
Dept: PHYSICAL THERAPY | Age: 35
End: 2020-12-17

## 2020-12-21 ENCOUNTER — TREATMENT (OUTPATIENT)
Dept: PHYSICAL THERAPY | Age: 35
End: 2020-12-21
Payer: COMMERCIAL

## 2020-12-21 PROCEDURE — 97110 THERAPEUTIC EXERCISES: CPT | Performed by: PHYSICAL THERAPIST

## 2020-12-21 NOTE — PROGRESS NOTES
Physical Therapy Daily Treatment Note    Date: 2020  Patient Name: Carlos Manuel Keen  : 1985   MRN: [id number]  [place]: 2020  DOSx: 20 Open repair distal biceps tendon right elbow  Referring Provider:   Vitaly Pizano DO  [address], [facility]         Medical Diagnosis:    Diagnosis Orders   1. Rupture of distal biceps tendon, right, initial encounter       Outcome Measure: Quick Dash: 36% Impairment    S: Patient had no complaints and still feels good with no pain. Pt stated that he felt new exercises and they were adequately challenging but not painful. O: Pt given written HEP  Time 4379-2066     Visit 4 Repeat outcome measure at mid point and end.     Pain 0/10     ROM WNL at elbow, 140 active flex     Modalities            Manual                  Stretch      Table slides      Wall Walk Flex      Wall Walk ABD      Wall ER Stretch      Towel IR Stretch      Supine Stretch with Arms Behind Head            Exercise      UBE     3/3  TE   Pulleys - Flex      Pulleys - IR      Supine Wand Flex      Supine Wand Bench Press      Supine Wand ER/IR      Standing Wand IR      Standing Wand Scaption      Standing Wand Flex      Standing Wand ER/IR      Shrugs AROM       Pendulum Ex            Biceps Stretch  5 reps x 1 set with 10s holds  TE   Pronation/Supination Stretch 5 reps x 1 set with 10s holds  TE   Biceps Curl  TE   Triceps Curl   TE   Supination/Pronation  TE   Shoulder Flexion  TE   Biceps Curl 15# 20 reps x 2 sets     Triceps Ext 15# 20 reps x 2 sets     Shoulder Flex 5# 15 reps x 2 sets     Supine Flex      S-lying ABD      S-lying ER            Prone Flexion      Prone Ext      Prone Row with ER      Prone Horizontal ABD            Standing Flex      Standing Scaption       Standing ABD      Standing Shoulder Press       Functional activities To aid in ROM and strength needed for reaching , lifting ,pushing and pulling at home/work ROWS: H 20# 15 reps x 2 sets   TA   ROWS: M 20# 15 reps x 2 sets   TA   ROWS: L 20# 15 reps x 2 sets   TA   ER 5# 15 reps x 2 sets      IR 5# 15 reps x 2 sets     Biceps Curl     Shoulder Flex     Shoulder ABD     A:  Tolerated added weight and resistive bands well. Pt tolerated increase in weights well without additional pain and his endurance is improving.    P: Continue with rehab plan  Nile Kirby PT    Treatment Charges: Mins Units   Initial Evaluation     Re-Evaluation     Ther Exercise         TE 45 3   Manual Therapy     MT     Ther Activities        TA     Gait Training          GT     Neuro Re-education NR     Modalities     Non-Billable Service Time     Other     Total Time/Units 45 3

## 2020-12-23 ENCOUNTER — TREATMENT (OUTPATIENT)
Dept: PHYSICAL THERAPY | Age: 35
End: 2020-12-23
Payer: COMMERCIAL

## 2020-12-23 PROCEDURE — 97110 THERAPEUTIC EXERCISES: CPT | Performed by: PHYSICAL THERAPIST

## 2020-12-23 NOTE — PROGRESS NOTES
Physical Therapy Daily Treatment Note    Date: 2020  Patient Name: Chucho Henry  : 1985   MRN: [id number]  DOInjury: 2020  DOSx: 20 Open repair distal biceps tendon right elbow  Referring Provider:   Colette Roberson DO  [address] [facility] [address] Diagnosis:    Diagnosis Orders   1. Rupture of distal biceps tendon, right, initial encounter       Outcome Measure: Quick Dash: 36% Impairment    S: Patient had no complaints and still feels good with no pain. Pt stated that he felt new exercises and they were adequately challenging but not painful. O: Pt given written HEP  Time 2029-2743     Visit 5 Repeat outcome measure at mid point and end.     Pain 0/10     ROM WNL      Modalities            Manual                  Stretch      Table slides      Wall Walk Flex      Wall Walk ABD      Wall ER Stretch      Towel IR Stretch      Supine Stretch with Arms Behind Head            Exercise      UBE     3/3  TE   Pulleys - Flex      Pulleys - IR      Supine Wand Flex      Supine Wand Bench Press      Supine Wand ER/IR      Standing Wand IR      Standing Wand Scaption      Standing Wand Flex      Standing Wand ER/IR      Shrugs AROM       Pendulum Ex            Biceps Stretch  5 reps x 1 set with 10s holds  TE   Pronation/Supination Stretch 5 reps x 1 set with 10s holds  TE   Biceps Curl 6# 15 reps x 2 sets Add weight TE   Triceps Curl  6# 15 reps x 2 sets Add weight TE   Supination/Pronation 6# 15 reps x 2 sets Add weight TE   Shoulder Flexion 6# 15 reps x 2 setsAdd weight TE   Shoulder Press 6# 15 reps x 2 setsAdd weight    Ulnar/Radial Deviation 6# 15 reps x 2 setsAdd weight    Biceps Curl     Triceps Ext     Shoulder Flex     Supine Flex      S-lying ABD      S-lying ER            Prone Flexion      Prone Ext      Prone Row with ER      Prone Horizontal ABD            Standing Flex      Standing Scaption       Standing ABD      Standing Shoulder Press Functional activities To aid in ROM and strength needed for reaching , lifting ,pushing and pulling at home/work    ROWS: H 30# 15 reps x 1 set Add weight TA   ROWS: M 30# 15 reps x 1 set  Add weight TA   ROWS: L 30# 15 reps x 1 set  Add weight TA   ER 5# 15 reps x 1 set Add weight    IR 10# 15 reps x 1 set Add weight    Biceps Curl     Shoulder Flex     Shoulder ABD     A:  Tolerated added weight and resistive bands well. Pt tolerated increase in weights well without additional pain and his endurance is improving.    P: Continue with rehab plan  Bruno Baxter PT    Treatment Charges: Mins Units   Initial Evaluation     Re-Evaluation     Ther Exercise         TE 45 3   Manual Therapy     MT     Ther Activities        TA     Gait Training          GT     Neuro Re-education NR     Modalities     Non-Billable Service Time     Other     Total Time/Units 45 3

## 2020-12-30 ENCOUNTER — TREATMENT (OUTPATIENT)
Dept: PHYSICAL THERAPY | Age: 35
End: 2020-12-30
Payer: COMMERCIAL

## 2020-12-30 PROCEDURE — 97110 THERAPEUTIC EXERCISES: CPT | Performed by: PHYSICAL THERAPIST

## 2020-12-30 NOTE — PROGRESS NOTES
Physical Therapy Daily Treatment Note    Date: 2020  Patient Name: Cb Carmona  : 1985   MRN: [id number]  DOInjury: 2020  DOSx: 20 Open repair distal biceps tendon right elbow  Referring Provider:   Lynda Daigle DO   [address] Diagnosis:    Diagnosis Orders   1. Rupture of distal biceps tendon, right, initial encounter       Outcome Measure: Quick Dash: 36% Impairment    S: Patient had no complaints and still feels good with no pain. Pt stated that he felt new exercises and they were adequately challenging but not painful. O: Pt given written HEP  Time 7013-8797     Visit 6 Repeat outcome measure at mid point and end.     Pain 0/10     ROM WNL      Modalities            Manual                  Stretch      Table slides      Wall Walk Flex      Wall Walk ABD      Wall ER Stretch      Towel IR Stretch      Supine Stretch with Arms Behind Head            Exercise      UBE     3/3  TE   Pulleys - Flex      Pulleys - IR      Supine Wand Flex      Supine Wand Bench Press      Supine Wand ER/IR      Standing Wand IR      Standing Wand Scaption      Standing Wand Flex      Standing Wand ER/IR      Shrugs AROM       Pendulum Ex            Biceps Stretch  5 reps x 1 set with 10s holds  TE   Pronation/Supination Stretch 5 reps x 1 set with 10s holds  TE   Biceps Curl 8# 15 reps x 2 sets Add weight TE   Triceps Curl  8# 15 reps x 2 sets Add weight TE   Supination/Pronation 8# 15 reps x 2 sets Add weight TE   Shoulder Flexion 8# 15 reps x 2 setsAdd weight TE   Shoulder Press 8# 15 reps x 2 setsAdd weight    Ulnar/Radial Deviation 8# 15 reps x 2 setsAdd weight    Biceps Curl     Triceps Ext     Shoulder Flex     Supine Flex      S-lying ABD      S-lying ER            Prone Flexion      Prone Ext      Prone Row with ER      Prone Horizontal ABD            Standing Flex      Standing Scaption       Standing ABD      Standing Shoulder Press Functional activities To aid in ROM and strength needed for reaching , lifting ,pushing and pulling at home/work    ROWS: H 40# 15 reps x 2 sets Add weight TA   ROWS: M 40# 15 reps x 2 sets  Add weight TA   ROWS: L 40# 15 reps x 2 sets  Add weight TA   ER 10# 15 reps x 2 sets Add weight    IR 10# 15 reps x 2 sets Add weight    Biceps Curl     Shoulder Flex     Shoulder ABD     A:  Tolerated added weight and resistive bands well. Pt tolerated increase in weights well without additional pain and his endurance is improving.    P: Continue with rehab plan  Bruno Baxter PT    Treatment Charges: Mins Units   Initial Evaluation     Re-Evaluation     Ther Exercise         TE 45 3   Manual Therapy     MT     Ther Activities        TA     Gait Training          GT     Neuro Re-education NR     Modalities     Non-Billable Service Time     Other     Total Time/Units 45 3

## 2021-01-04 ENCOUNTER — TREATMENT (OUTPATIENT)
Dept: PHYSICAL THERAPY | Age: 36
End: 2021-01-04

## 2021-01-04 DIAGNOSIS — S46.211A RUPTURE OF DISTAL BICEPS TENDON, RIGHT, INITIAL ENCOUNTER: Primary | ICD-10-CM

## 2021-01-04 NOTE — PROGRESS NOTES
Physical Therapy Daily Treatment Note    Date: 2021  Patient Name: Anna Allen  : 1985   MRN: [id number]  DOInjury: 2020  DOSx: 20 Open repair distal biceps tendon right elbow  Referring Provider:   Ria Stafford DO  [address]         Medical Diagnosis:    Diagnosis Orders   1. Rupture of distal biceps tendon, right, initial encounter       Outcome Measure: Quick Dash: 36% Impairment    S: Patient had no complaints and still feels good with no pain. Pt stated that he felt new exercises and they were adequately challenging but not painful. O: Pt given written HEP  Time 2506-     Visit 7 Repeat outcome measure at mid point and end.     Pain 0/10     ROM WNL      Modalities            Manual                  Stretch      Table slides      Wall Walk Flex      Wall Walk ABD      Wall ER Stretch      Towel IR Stretch      Supine Stretch with Arms Behind Head            Exercise      UBE     3/3  TE   Pulleys - Flex      Pulleys - IR      Supine Wand Flex      Supine Wand Bench Press      Supine Wand ER/IR      Standing Wand IR      Standing Wand Scaption      Standing Wand Flex      Standing Wand ER/IR      Shrugs AROM       Pendulum Ex            Biceps Stretch   TE   Pronation/Supination Stretch  TE   Biceps Curl 10# 15 reps x 2 sets  TE   Triceps Curl  10# 15 reps x 2 sets  TE   Supination/Pronation 10# 15 reps x 2 sets  TE   Shoulder Flexion 10# 15 reps x 2 sets TE   Shoulder Press 10# 15 reps x 2 sets    Ulnar/Radial Deviation 10# 15 reps x 2 sets    Biceps Curl 25# 20 reps x 2 sets     Triceps Ext 35# 20 reps x 2 sets     Shoulder Flex 10# 15 reps x 2 sets     Supine Flex      S-lying ABD      S-lying ER            Prone Flexion      Prone Ext      Prone Row with ER      Prone Horizontal ABD            Standing Flex      Standing Scaption       Standing ABD      Standing Shoulder Press Functional activities To aid in ROM and strength needed for reaching , lifting ,pushing and pulling at home/work    ROWS: H Add weight TA   ROWS: M Add weight TA   ROWS: L Add weight TA   ER Add weight    IR Add weight    Biceps Curl     Shoulder Flex     Shoulder ABD     A:  Tolerated added weight and resistive bands well. Pt tolerated increase in weights well without additional pain and his endurance is improving.     P: Continue with rehab plan  Sim Hall PT    Treatment Charges: Mins Units   Initial Evaluation     Re-Evaluation     Ther Exercise         TE 45 3   Manual Therapy     MT     Ther Activities        TA     Gait Training          GT     Neuro Re-education NR     Modalities     Non-Billable Service Time     Other     Total Time/Units 45 3

## 2021-01-06 ENCOUNTER — TREATMENT (OUTPATIENT)
Dept: PHYSICAL THERAPY | Age: 36
End: 2021-01-06
Payer: COMMERCIAL

## 2021-01-06 DIAGNOSIS — S46.211A RUPTURE OF DISTAL BICEPS TENDON, RIGHT, INITIAL ENCOUNTER: Primary | ICD-10-CM

## 2021-01-06 PROCEDURE — 97110 THERAPEUTIC EXERCISES: CPT | Performed by: PHYSICAL THERAPIST

## 2021-01-06 NOTE — PROGRESS NOTES
Prone Horizontal ABD            Standing Flex      Standing Scaption       Standing ABD      Standing Shoulder Press       Functional activities To aid in ROM and strength needed for reaching , lifting ,pushing and pulling at home/work    ROWS: H 50# 15 reps x 1 set  TA   ROWS: M 50# 15 reps x 1 set   TA   ROWS: L 50# 15 reps x 1 set  TA   ER 10# 15 reps x 1 set    IR 15# 15 reps x 1 set    Biceps Curl     Shoulder Flex     Shoulder ABD     A:  Tolerated added weight and resistive bands well. Pt tolerated increase in weights well without additional pain and his endurance is improving. P: Continue with rehab plan.   Jonathan Menjivar PT    Treatment Charges: Mins Units   Initial Evaluation     Re-Evaluation     Ther Exercise         TE 45 3   Manual Therapy     MT     Ther Activities        TA     Gait Training          GT     Neuro Re-education NR     Modalities     Non-Billable Service Time     Other     Total Time/Units 45 3

## 2021-01-08 ENCOUNTER — OFFICE VISIT (OUTPATIENT)
Dept: ORTHOPEDIC SURGERY | Age: 36
End: 2021-01-08
Payer: COMMERCIAL

## 2021-01-08 VITALS — HEIGHT: 67 IN | BODY MASS INDEX: 49.44 KG/M2 | WEIGHT: 315 LBS | TEMPERATURE: 98.6 F

## 2021-01-08 DIAGNOSIS — S46.211A BICEPS RUPTURE, DISTAL, RIGHT, INITIAL ENCOUNTER: Primary | ICD-10-CM

## 2021-01-08 PROCEDURE — 99213 OFFICE O/P EST LOW 20 MIN: CPT | Performed by: ORTHOPAEDIC SURGERY

## 2021-01-08 NOTE — PROGRESS NOTES
Chief Complaint:   Chief Complaint   Patient presents with    Arm Injury     Right distal bicep tendon repair follow up. DOS 9/8/2020 Arm feels good. Feels almost back to normal. Just working on building strength back up. Anna Allen follows up 4 months postop right distal biceps tendon repair. He is doing real well. Is been physical therapy about 4 weeks and he says doing well also. He is apparently scheduled for about 6 weeks total therapy. He has been doing light duty at work which is mainly computer type work seated. It is not strenuous at all. 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 Allergies; medications; past medical, surgical, family, and social history; and problem list have been reviewed today and updated as indicated in this encounter seen below. Exam: The incisions are well-healed. The biceps tendon prominence distally is good. Function of the biceps is good overall. Forearm rotation is full. Elbow motion is full. He has no tenderness to palpation. There is no crepitus. Radiographs: None    ASSESSMENT:    Олег Morin was seen today for arm injury. Diagnoses and all orders for this visit:    Biceps rupture, distal, right, initial encounter        PLAN: Finish the physical therapy. We will follow-up in about 4 weeks. We will maintain restrictions for about 6 months to avoid strenuous use of the biceps particular considering that this is a substantial gentleman. Return in about 4 weeks (around 2/5/2021).        Current Outpatient Medications   Medication Sig Dispense Refill    ibuprofen (ADVIL;MOTRIN) 800 MG tablet Take 1 tablet by mouth 3 times daily (with meals) 90 tablet 5    lisinopril (PRINIVIL;ZESTRIL) 20 MG tablet Take 1 tablet by mouth daily 90 tablet 1    atorvastatin (LIPITOR) 10 MG tablet Take 1 tablet by mouth daily (Patient taking differently: Take 10 mg by mouth nightly ) 90 tablet 1  hydroCHLOROthiazide (HYDRODIURIL) 25 MG tablet TAKE 1 TABLET BY MOUTH DAILY 90 tablet 3    hydrocortisone 2.5 % cream Apply topically 2 times daily. 20 g 0     No current facility-administered medications for this visit.         Patient Active Problem List   Diagnosis    Tobacco abuse    Essential hypertension    Skin tags, multiple acquired    MADDIE (obstructive sleep apnea)    Rupture of distal biceps tendon, right, initial encounter       Past Medical History:   Diagnosis Date    Hyperlipidemia     Hypertension        Past Surgical History:   Procedure Laterality Date    ARM SURGERY Right 2020    DISTAL BICEPS TENDON RUPTURE REPAIR-RIGHT performed by Rubina Jules DO at [address]      bilateral    FRACTURE SURGERY      left knee       No Known Allergies    Social History     Socioeconomic History    Marital status:      Spouse name: None    Number of children: None    Years of education: None    Highest education level: None   Occupational History    None   Social Needs    Financial resource strain: None    Food insecurity     Worry: None     Inability: None    Transportation needs     Medical: None     Non-medical: None   Tobacco Use    Smoking status: Former Smoker     Packs/day: 0.50     Years: 10.00     Pack years: 5.00     Types: Cigarettes     Quit date: 2010     Years since quittin.0    Smokeless tobacco: Current User    Tobacco comment: patient quit   Substance and Sexual Activity    Alcohol use: Yes     Comment: occasionally    Drug use: No    Sexual activity: Yes     Partners: Female     Comment: 1 partner -  7 years    Lifestyle    Physical activity     Days per week: None     Minutes per session: None    Stress: None   Relationships    Social connections     Talks on phone: None     Gets together: None     Attends [employer] service: None     Active member of club or organization: None Attends meetings of clubs or organizations: None     Relationship status: None    Intimate partner violence     Fear of current or ex partner: None     Emotionally abused: None     Physically abused: None     Forced sexual activity: None   Other Topics Concern    None   Social History Narrative    None       Review of Systems  As follows except as previously noted in HPI:  Constitutional: Negative for chills, diaphoresis, fatigue, fever and unexpected weight change. Respiratory: Negative for cough, shortness of breath and wheezing. Cardiovascular: Negative for chest pain and palpitations. Neurological: Negative for dizziness, syncope, cephalgia. GI / : negative  Musculoskeletal: see HPI       Objective:   Physical Exam   Constitutional: Oriented to person, place, and time. and appears well-developed and well-nourished. :   Head: Normocephalic and atraumatic. Eyes: EOM are normal.   Neck: Neck supple. Cardiovascular: Normal rate and regular rhythm. Pulmonary/Chest: Effort normal. No stridor. No respiratory distress, no wheezes. Abdominal:  No abnormal distension. Neurological: Alert and oriented to person, place, and time. Skin: Skin is warm and dry. Psychiatric: Normal mood and affect.  Behavior is normal. Thought content normal.    ASHWINI Phillips DO    1/8/21  9:25 AM

## 2021-01-13 ENCOUNTER — TREATMENT (OUTPATIENT)
Dept: PHYSICAL THERAPY | Age: 36
End: 2021-01-13
Payer: COMMERCIAL

## 2021-01-13 DIAGNOSIS — S46.211A RUPTURE OF DISTAL BICEPS TENDON, RIGHT, INITIAL ENCOUNTER: Primary | ICD-10-CM

## 2021-01-13 PROCEDURE — 97110 THERAPEUTIC EXERCISES: CPT | Performed by: PHYSICAL THERAPIST

## 2021-01-13 NOTE — PROGRESS NOTES
Physical Therapy Daily Treatment Note    Date: 2021  Patient Name: Charlotte Nolan  : 1985   MRN: [id number]  DOInjury: 2020  DOSx: 20 Open repair distal biceps tendon right elbow  Referring Provider:   Florina Solis DO   [address] [employer] [address]         Medical Diagnosis:    Diagnosis Orders   1. Rupture of distal biceps tendon, right, initial encounter       Outcome Measure: Quick Dash: 36% Impairment on eval on 21               Quick Dash: 16% impairment on 21    S: Patient had no complaints and still feels good with no pain. Pt stated that he did not have any pain, just fatigue following the exercises. O: Pt given written HEP  Time 6705-6923     Visit 10 Repeat outcome measure at mid point and end.     Pain 0/10     ROM WNL      Modalities            Manual                  Stretch      Table slides      Wall Walk Flex      Wall Walk ABD      Wall ER Stretch      Towel IR Stretch      Supine Stretch with Arms Behind Head            Exercise      UBE     3/3  TE   Pulleys - Flex      Pulleys - IR      Supine Wand Flex      Supine Wand Bench Press      Supine Wand ER/IR      Standing Wand IR      Standing Wand Scaption      Standing Wand Flex      Standing Wand ER/IR      Shrugs AROM       Pendulum Ex            Biceps Stretch  10 reps x 1 set with 10s holds TE   Pronation/Supination Stretch  TE   Biceps Curl 15# 15 reps x 2 sets  TE   Triceps Curl  15# 15 reps x 2 sets  TE   Supination/Pronation  TE   Shoulder Flexion 15# 15 reps x 2 sets TE   Shoulder Press 15# 15 reps x 2 sets    Ulnar/Radial Deviation     Biceps Curl with Bar 45# 12 reps x 2 sets     Triceps Ext with Bar 45# 20 reps x 2 sets     Shoulder Flex 10# 15 reps x  1 set     Biceps Curl  20# 8 reps x 1 set     Triceps Ext 25# 12 reps x 1 set     Supine Flex      S-lying ABD      S-lying ER            Prone Flexion      Prone Ext      Prone Row with ER      Prone Horizontal ABD Standing Flex      Standing Scaption       Standing ABD      Standing Shoulder Press       Functional activities To aid in ROM and strength needed for reaching , lifting ,pushing and pulling at home/work    ROWS: H 50# 15 reps x 1 set  TA   ROWS: M 50# 15 reps x 1 set   TA   ROWS: L 50# 15 reps x 1 set  TA   ER 10# 15 reps x 1 set    IR 15# 15 reps x 1 set    Biceps Curl     Shoulder Flex     Shoulder ABD     A:  Tolerated added weight and resistive bands well. Pt tolerated increase in weights well without additional pain and his endurance is improving. P: Continue with rehab plan.   William Holt PT    Treatment Charges: Mins Units   Initial Evaluation     Re-Evaluation     Ther Exercise         TE 45 3   Manual Therapy     MT     Ther Activities        TA     Gait Training          GT     Neuro Re-education NR     Modalities     Non-Billable Service Time     Other     Total Time/Units 45 3

## 2021-01-15 ENCOUNTER — TELEPHONE (OUTPATIENT)
Dept: PHYSICAL THERAPY | Age: 36
End: 2021-01-15

## 2021-01-20 ENCOUNTER — TREATMENT (OUTPATIENT)
Dept: PHYSICAL THERAPY | Age: 36
End: 2021-01-20
Payer: COMMERCIAL

## 2021-01-20 DIAGNOSIS — S46.211A RUPTURE OF DISTAL BICEPS TENDON, RIGHT, INITIAL ENCOUNTER: Primary | ICD-10-CM

## 2021-01-20 PROCEDURE — 97110 THERAPEUTIC EXERCISES: CPT

## 2021-01-20 NOTE — PROGRESS NOTES
Standing Shoulder Press       Functional activities To aid in ROM and strength needed for reaching , lifting ,pushing and pulling at home/work    ROWS: H 50# 15 reps x 1 set     ROWS: M 50# 15 reps x 1 set      ROWS: L 50# 15 reps x 1 set     ER 10# 15 reps x 1 set    IR 15# 15 reps x 1 set    Biceps Curl     Shoulder Flex     Shoulder ABD     A:  Tolerated well. No new changes noted today . P: Continue with rehab plan.   Gean Kussmaul, PTA    Treatment Charges: Mins Units   Initial Evaluation     Re-Evaluation     Ther Exercise         TE 40 3   Manual Therapy     MT     Ther Activities        TA     Gait Training          GT     Neuro Re-education NR     Modalities     Non-Billable Service Time     Other     Total Time/Units 40 3

## 2021-01-22 ENCOUNTER — TREATMENT (OUTPATIENT)
Dept: PHYSICAL THERAPY | Age: 36
End: 2021-01-22
Payer: COMMERCIAL

## 2021-01-22 DIAGNOSIS — S46.211A RUPTURE OF DISTAL BICEPS TENDON, RIGHT, INITIAL ENCOUNTER: Primary | ICD-10-CM

## 2021-01-22 PROCEDURE — 97110 THERAPEUTIC EXERCISES: CPT | Performed by: PHYSICAL THERAPIST

## 2021-01-22 NOTE — PROGRESS NOTES
Physical Therapy Daily Treatment Note    Date: 2021  Patient Name: Annelise Mayers   \"Bill\"  : 1985   MRN: [id number]  DOInjury: 2020  DOSx: 20 Open repair distal biceps tendon right elbow  Referring Provider:   Ernestina Cole DO  [address] [place] [address], [facility] Diagnosis:    Diagnosis Orders   1. Rupture of distal biceps tendon, right, initial encounter       Outcome Measure: Quick Dash: 36% Impairment on eval on 21               Quick Dash: 16% impairment on 21    S: Patient reports no new changes. O: Pt given written HEP  Time 8423-0035     Visit 12 Repeat outcome measure at mid point and end.     Pain 0/10     ROM WNL      Modalities            Manual                  Stretch      Table slides      Wall Walk Flex      Wall Walk ABD      Wall ER Stretch      Towel IR Stretch      Supine Stretch with Arms Behind Head            Exercise      UBE     3/3  TE   Pulleys - Flex      Pulleys - IR      Supine Wand Flex      Supine Wand Bench Press      Supine Wand ER/IR      Standing Wand IR      Standing Wand Scaption      Standing Wand Flex      Standing Wand ER/IR      Shrugs AROM       Pendulum Ex            Biceps Stretch   TE   Pronation/Supination Stretch  TE   Biceps Curl 20# 15 reps x 2 sets  TE   Biceps Hammers 20# 15 reps x 2 sets     Triceps Curl  20# 15 reps x 2 sets  TE   Supination/Pronation 20# 15 reps x 2 sets TE   Shoulder Flexion 20# 15 reps x 2 sets TE   Shoulder Press 20# 15 reps x 2 sets TE   Ulnar/Radial Deviation  TE   Biceps Curl with Bar 45# 12 reps x 2 sets  TE   Triceps Ext with Bar 45# 20 reps x 2 sets  TE   Shoulder Flex 10# 15 reps x 2 sets  TE   Biceps Curl  20# 10 reps x 2 sets  TE   Triceps Ext 25# 12 reps x 2 sets  TE   Supine Flex      S-lying ABD      S-lying ER            Prone Flexion      Prone Ext      Prone Row with ER      Prone Horizontal ABD            Standing Flex      Standing Scaption Standing ABD      Standing Shoulder Press       Functional activities To aid in ROM and strength needed for reaching , lifting ,pushing and pulling at home/work    ROWS: H 50# 15 reps x 1 set     ROWS: M 50# 15 reps x 1 set      ROWS: L 50# 15 reps x 1 set     ER 10# 15 reps x 1 set    IR 15# 15 reps x 1 set    Biceps Curl     Shoulder Flex     Shoulder ABD     A:  Tolerated well. No new changes noted today. P: Continue with rehab plan.   Janna Vang PT    Treatment Charges: Mins Units   Initial Evaluation     Re-Evaluation     Ther Exercise         TE 45 3   Manual Therapy     MT     Ther Activities        TA     Gait Training          GT     Neuro Re-education NR     Modalities     Non-Billable Service Time     Other     Total Time/Units 45 3

## 2021-01-29 ENCOUNTER — TREATMENT (OUTPATIENT)
Dept: PHYSICAL THERAPY | Age: 36
End: 2021-01-29
Payer: COMMERCIAL

## 2021-01-29 DIAGNOSIS — S46.211A RUPTURE OF DISTAL BICEPS TENDON, RIGHT, INITIAL ENCOUNTER: Primary | ICD-10-CM

## 2021-01-29 PROCEDURE — 97110 THERAPEUTIC EXERCISES: CPT

## 2021-01-29 NOTE — PROGRESS NOTES
Physical Therapy Daily Treatment Note    Date: 2021  Patient Name: Jaz Moore   \"Bill\"  : 1985   MRN: [id number]  DOInjury: 2020  DOSx: 20 Open repair distal biceps tendon right elbow  Referring Provider:   Deborah Lo DO   [address] Diagnosis:    Diagnosis Orders   1. Rupture of distal biceps tendon, right, initial encounter       Outcome Measure: Quick Dash: 36% Impairment on eval on 21               Quick Dash: 16% impairment on 21    S: Patient reports no new changes. O: Pt given written HEP  Time 4968-9320     Visit 12 Repeat outcome measure at mid point and end.     Pain 0/10     ROM WNL      Modalities            Manual                  Stretch      Table slides      Wall Walk Flex      Wall Walk ABD      Wall ER Stretch      Towel IR Stretch      Supine Stretch with Arms Behind Head            Exercise      UBE     3/3  TE   Pulleys - Flex      Pulleys - IR      Supine Wand Flex      Supine Wand Bench Press      Supine Wand ER/IR      Standing Wand IR      Standing Wand Scaption      Standing Wand Flex      Standing Wand ER/IR      Shrugs AROM       Pendulum Ex            Biceps Stretch   TE   Pronation/Supination Stretch  TE   Biceps Curl 20# 15 reps x 2 sets  TE   Biceps Hammers 20# 15 reps x 2 sets     Triceps Curl  20# 15 reps x 2 sets  TE   Supination/Pronation 20# 15 reps x 2 sets TE   Shoulder Flexion 20# 15 reps x 2 sets TE   Shoulder Press 20# 15 reps x 2 sets TE   Ulnar/Radial Deviation  TE   Biceps Curl with Bar 45# 12 reps x 2 sets Pulley weight TE   Triceps Ext with Bar 45# 20 reps x 2 sets Pulley  TE   Shoulder Flex with cable 10# 15 reps x 2 sets  TE   Biceps Curl   TE   Triceps Ext  TE   Supine Flex      S-lying ABD      S-lying ER            Prone Flexion      Prone Ext      Prone Row with ER      Prone Horizontal ABD            Standing Flex      Standing Scaption       Standing ABD Standing Shoulder Press       Functional activities To aid in ROM and strength needed for reaching , lifting ,pushing and pulling at home/work    ROWS: H 50# 15 reps x 1 set     ROWS: M 50# 15 reps x 1 set      ROWS: L 50# 15 reps x 1 set     ER 10# 15 reps x 1 set    IR 15# 15 reps x 1 set    Biceps Curl     Shoulder Flex     Shoulder ABD     A:  Tolerated well. No new changes noted today. P: Continue with rehab plan.   Jammie Queen PTA    Treatment Charges: Mins Units   Initial Evaluation     Re-Evaluation     Ther Exercise         TE 45 3   Manual Therapy     MT     Ther Activities        TA     Gait Training          GT     Neuro Re-education NR     Modalities     Non-Billable Service Time     Other     Total Time/Units 45 3

## 2021-02-03 ENCOUNTER — TREATMENT (OUTPATIENT)
Dept: PHYSICAL THERAPY | Age: 36
End: 2021-02-03
Payer: COMMERCIAL

## 2021-02-03 DIAGNOSIS — S46.211A RUPTURE OF DISTAL BICEPS TENDON, RIGHT, INITIAL ENCOUNTER: Primary | ICD-10-CM

## 2021-02-03 PROCEDURE — 97110 THERAPEUTIC EXERCISES: CPT | Performed by: PHYSICAL THERAPIST

## 2021-02-03 NOTE — PROGRESS NOTES
Physical Therapy Daily Treatment Note    Date: 2/3/2021  Patient Name: Miri Monreal   \"Bill\"  : 1985   MRN: [id number]  DOInjury: 2020  DOSx: 20 Open repair distal biceps tendon right elbow  Referring Provider:   Elda Vega DO   [address] Diagnosis:    Diagnosis Orders   1. Rupture of distal biceps tendon, right, initial encounter       Outcome Measure: Quick Dash: 36% Impairment on eval on 21               Quick Dash: 16% impairment on 21    S: Patient reports no new changes. O: Pt given written HEP  Time 7370-7970     Visit 12 Repeat outcome measure at mid point and end.     Pain 0/10     ROM WNL      Modalities            Manual                  Stretch      Table slides      Wall Walk Flex      Wall Walk ABD      Wall ER Stretch      Towel IR Stretch      Supine Stretch with Arms Behind Head            Exercise      UBE     3/3  TE   Pulleys - Flex      Pulleys - IR      Supine Wand Flex      Supine Wand Bench Press      Supine Wand ER/IR      Standing Wand IR      Standing Wand Scaption      Standing Wand Flex      Standing Wand ER/IR      Shrugs AROM       Pendulum Ex            Biceps Stretch   TE   Pronation/Supination Stretch  TE   Biceps Curl 25# 15 reps x 1 set and 30# 9 reps x 1 set increase TE   Biceps Hammers 25# 15 reps x 1 set and 30# 10 reps x 1 set    Increase     Triceps Curl  25# 15 reps x 2 sets and 30# 12 reps x 1 set increase TE   Supination/Pronation 20# 10 reps x 2 sets TE   Shoulder Flexion 25# 12 reps x 2 sets TE   Shoulder Press 30# 12 reps x 2 setsincrease TE   Ulnar/Radial Deviation  TE   Biceps Curl with Bar 45# 12 reps x 2 sets NEXT Increase  TE   Triceps Ext with Bar 45# 20 reps x 2 sets NEXT Increase TE   Shoulder Flex with cable 10# 15 reps x 2 sets  TE   Biceps Curl   TE   Triceps Ext  TE   Supine Flex      S-lying ABD      S-lying ER            Prone Flexion      Prone Ext Prone Row with ER      Prone Horizontal ABD            Standing Flex      Standing Scaption       Standing ABD      Standing Shoulder Press      Vertical Row 130# 15 reps x 2 sets  increase    Lat Pull Down 130# 12 reps x 2 sets Increase     Functional activities To aid in ROM and strength needed for reaching , lifting ,pushing and pulling at home/work    ROWS: H 50# 15 reps x 1 set Next increase    ROWS: M 50# 15 reps x 1 set  Next increase    ROWS: L 50# 15 reps x 1 set Next increase    ER 10# 15 reps x 1 set    IR 15# 15 reps x 1 set    Biceps Curl     Shoulder Flex     Shoulder ABD     A:  Tolerated well. No new changes noted today. P: Continue with rehab plan.   Jana Braun, PT    Treatment Charges: Mins Units   Initial Evaluation     Re-Evaluation     Ther Exercise         TE 45 3   Manual Therapy     MT     Ther Activities        TA     Gait Training          GT     Neuro Re-education NR     Modalities     Non-Billable Service Time     Other     Total Time/Units 45 3

## 2021-02-05 ENCOUNTER — TREATMENT (OUTPATIENT)
Dept: PHYSICAL THERAPY | Age: 36
End: 2021-02-05
Payer: COMMERCIAL

## 2021-02-05 ENCOUNTER — OFFICE VISIT (OUTPATIENT)
Dept: ORTHOPEDIC SURGERY | Age: 36
End: 2021-02-05
Payer: COMMERCIAL

## 2021-02-05 VITALS — WEIGHT: 315 LBS | HEIGHT: 67 IN | TEMPERATURE: 98 F | BODY MASS INDEX: 49.44 KG/M2

## 2021-02-05 DIAGNOSIS — S46.211A BICEPS RUPTURE, DISTAL, RIGHT, INITIAL ENCOUNTER: Primary | ICD-10-CM

## 2021-02-05 DIAGNOSIS — S46.211A RUPTURE OF DISTAL BICEPS TENDON, RIGHT, INITIAL ENCOUNTER: Primary | ICD-10-CM

## 2021-02-05 PROCEDURE — 99213 OFFICE O/P EST LOW 20 MIN: CPT | Performed by: ORTHOPAEDIC SURGERY

## 2021-02-05 PROCEDURE — 97110 THERAPEUTIC EXERCISES: CPT | Performed by: PHYSICAL THERAPIST

## 2021-02-05 NOTE — PROGRESS NOTES
Physical Therapy Daily Treatment Note    Date: 2021  Patient Name: Олег Richmond   \"Bill\"  : 1985   MRN: [id number]  DOInjury: 2020  DOSx: 20 Open repair distal biceps tendon right elbow  Referring Provider:   Patsy Valladares DO   [address] Diagnosis:    Diagnosis Orders   1. Rupture of distal biceps tendon, right, initial encounter       Outcome Measure: Quick Dash: 36% Impairment on eval on 21               Quick Dash: 16% impairment on 21    S: Patient stated that he was cleared to go back to work following his Dr. Velasquez [employer]illing this morning and pt stated that he feels like he is ready to return to work. O: Pt given written HEP  Time 747-426     Visit 13 Repeat outcome measure at mid point and end.     Pain 0/10     ROM WNL      Modalities            Manual                  Stretch      Table slides      Wall Walk Flex      Wall Walk ABD      Wall ER Stretch      Towel IR Stretch      Supine Stretch with Arms Behind Head            Exercise      UBE     3/3  TE   Pulleys - Flex      Pulleys - IR      Supine Wand Flex      Supine Wand Bench Press      Supine Wand ER/IR      Standing Wand IR      Standing Wand Scaption      Standing Wand Flex      Standing Wand ER/IR      Shrugs AROM       Pendulum Ex            Biceps Stretch   TE   Pronation/Supination Stretch  TE   Biceps Curl 35# 10 reps x 1 set  TE   Biceps Hammers 35# 10 reps x 1 set        Triceps Curl  35# 12 reps x 1 set  TE   Supination/Pronation  TE   Shoulder Flexion 25# 10 reps x 1 set TE   Shoulder Press 35# 8 reps x 2 sets TE   Ulnar/Radial Deviation  TE   Biceps Curl with Bar 50# 13 reps x 1 set   TE   Triceps Ext with Bar 55# 15 reps x 1 set  TE   Shoulder Flex with cable   TE   Biceps Curl   TE   Triceps Ext  TE   Supine Flex      S-lying ABD      S-lying ER            Prone Flexion      Prone Ext      Prone Row with ER      Prone Horizontal ABD Standing Flex      Standing Scaption       Standing ABD      Standing Shoulder Press      Vertical Row 145# 12 reps x 1 set  increase    Lat Pull Down 145# 12 reps x 1 set Increase     Functional activities To aid in ROM and strength needed for reaching , lifting ,pushing and pulling at home/work    ROWS: H 60# 15 reps x 1 set     ROWS: M 60# 15 reps x 1 set      ROWS: L 60# 15 reps x 1 set     ER 10# 15 reps x 1 set    IR 15# 15 reps x 1 set    Biceps Curl     Shoulder Flex     Shoulder ABD     A:  Tolerated well. Pt achieved all PT goals and is ready for d/c.   P: Continue with rehab plan.   Gina Pillai PT    Treatment Charges: Mins Units   Initial Evaluation     Re-Evaluation     Ther Exercise         TE 45 3   Manual Therapy     MT     Ther Activities        TA     Gait Training          GT     Neuro Re-education NR     Modalities     Non-Billable Service Time     Other     Total Time/Units 45 3

## 2021-02-05 NOTE — PROGRESS NOTES
Chief Complaint:   Chief Complaint   Patient presents with    Arm Pain     Right Arm distal bicep tendon repair DOS 9/8/2020       José Paolo is 4 months postop right distal biceps tendon repair. He is doing real well. Progressing in therapy. He says he is up to 30 pound dumbbells and 130 pounds on a machine which he is doing pull downs and rowing type activities. He is having no problems with the arm. 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 Allergies; medications; past medical, surgical, family, and social history; and problem list have been reviewed today and updated as indicated in this encounter seen below. Exam: Incisions are well-healed the arm. Range of motion of the elbow is full. He has good tendon integrity. He has good pronation supination of the forearm and strength in his biceps is improving nicely. Radiographs: None    ASSESSMENT:    Homer Aggarwal was seen today for arm pain. Diagnoses and all orders for this visit:    Biceps rupture, distal, right, initial encounter        PLAN: He will finish physical therapy today. Would like to return to work and we will allow that. He still to avoid any strenuous lifting for full 6 months postop which he is aware of. We will follow on an as-needed basis. No follow-ups on file. Current Outpatient Medications   Medication Sig Dispense Refill    ibuprofen (ADVIL;MOTRIN) 800 MG tablet Take 1 tablet by mouth 3 times daily (with meals) 90 tablet 5    lisinopril (PRINIVIL;ZESTRIL) 20 MG tablet Take 1 tablet by mouth daily 90 tablet 1    atorvastatin (LIPITOR) 10 MG tablet Take 1 tablet by mouth daily (Patient taking differently: Take 10 mg by mouth nightly ) 90 tablet 1    hydroCHLOROthiazide (HYDRODIURIL) 25 MG tablet TAKE 1 TABLET BY MOUTH DAILY 90 tablet 3    hydrocortisone 2.5 % cream Apply topically 2 times daily.  20 g 0 No current facility-administered medications for this visit.         Patient Active Problem List   Diagnosis    Tobacco abuse    Essential hypertension    Skin tags, multiple acquired    MADDIE (obstructive sleep apnea)    Rupture of distal biceps tendon, right, initial encounter       Past Medical History:   Diagnosis Date    Hyperlipidemia     Hypertension        Past Surgical History:   Procedure Laterality Date    ARM SURGERY Right 2020    DISTAL BICEPS TENDON RUPTURE REPAIR-RIGHT performed by Abad Stack DO at [place] [id number]      bilateral    FRACTURE SURGERY      left knee       No Known Allergies    Social History     Socioeconomic History    Marital status:      Spouse name: None    Number of children: None    Years of education: None    Highest education level: None   Occupational History    None   Social Needs    Financial resource strain: None    Food insecurity     Worry: None     Inability: None    Transportation needs     Medical: None     Non-medical: None   Tobacco Use    Smoking status: Former Smoker     Packs/day: 0.50     Years: 10.00     Pack years: 5.00     Types: Cigarettes     Quit date: 2010     Years since quittin.1    Smokeless tobacco: Current User    Tobacco comment: patient quit   Substance and Sexual Activity    Alcohol use: Yes     Comment: occasionally    Drug use: No    Sexual activity: Yes     Partners: Female     Comment: 1 partner -  7 years    Lifestyle    Physical activity     Days per week: None     Minutes per session: None    Stress: None   Relationships    Social connections     Talks on phone: None     Gets together: None     Attends [employer]ism service: None     Active member of club or organization: None     Attends meetings of clubs or organizations: None     Relationship status: None    Intimate partner violence     Fear of current or ex partner: None     Emotionally abused: None Physically abused: None     Forced sexual activity: None   Other Topics Concern    None   Social History Narrative    None       Review of Systems  As follows except as previously noted in HPI:  Constitutional: Negative for chills, diaphoresis, fatigue, fever and unexpected weight change. Respiratory: Negative for cough, shortness of breath and wheezing. Cardiovascular: Negative for chest pain and palpitations. Neurological: Negative for dizziness, syncope, cephalgia. GI / : negative  Musculoskeletal: see HPI       Objective:   Physical Exam   Constitutional: Oriented to person, place, and time. and appears well-developed and well-nourished. :   Head: Normocephalic and atraumatic. Eyes: EOM are normal.   Neck: Neck supple. Cardiovascular: Normal rate and regular rhythm. Pulmonary/Chest: Effort normal. No stridor. No respiratory distress, no wheezes. Abdominal:  No abnormal distension. Neurological: Alert and oriented to person, place, and time. Skin: Skin is warm and dry. Psychiatric: Normal mood and affect.  Behavior is normal. Thought content normal.    ASHWINI Morrison DO    2/5/21  8:19 AM

## 2021-03-03 ENCOUNTER — OFFICE VISIT (OUTPATIENT)
Dept: FAMILY MEDICINE CLINIC | Age: 36
End: 2021-03-03
Payer: COMMERCIAL

## 2021-03-03 VITALS
RESPIRATION RATE: 20 BRPM | SYSTOLIC BLOOD PRESSURE: 126 MMHG | WEIGHT: 315 LBS | BODY MASS INDEX: 49.44 KG/M2 | DIASTOLIC BLOOD PRESSURE: 80 MMHG | HEIGHT: 67 IN | TEMPERATURE: 97 F | HEART RATE: 98 BPM | OXYGEN SATURATION: 96 %

## 2021-03-03 DIAGNOSIS — I10 ESSENTIAL HYPERTENSION: ICD-10-CM

## 2021-03-03 DIAGNOSIS — G47.33 OSA (OBSTRUCTIVE SLEEP APNEA): Primary | ICD-10-CM

## 2021-03-03 PROCEDURE — 99213 OFFICE O/P EST LOW 20 MIN: CPT | Performed by: FAMILY MEDICINE

## 2021-03-03 ASSESSMENT — PATIENT HEALTH QUESTIONNAIRE - PHQ9
SUM OF ALL RESPONSES TO PHQ9 QUESTIONS 1 & 2: 0
SUM OF ALL RESPONSES TO PHQ QUESTIONS 1-9: 0
1. LITTLE INTEREST OR PLEASURE IN DOING THINGS: 0

## 2021-03-03 ASSESSMENT — ENCOUNTER SYMPTOMS
NAUSEA: 0
SHORTNESS OF BREATH: 0
COUGH: 0
VOMITING: 0
WHEEZING: 0

## 2021-03-03 NOTE — PROGRESS NOTES
[address] [facility]  [phone]   Magui Bundy MD     Patient: Concepción Ruiz  YOB: 1985  Visit Date: 3/3/21    Marian Simons is a 38y.o. year old male here today for   Chief Complaint   Patient presents with    Hypertension     6 month follow up       HPI  Patient is a 38year old here for follow up of hypertension and obesity. Got a membership for [employer]. Got some supplements he plans to try from [employer]. Did it years ago to help with appetite. Take it 30 min before meals. Not interested in surgical or non surgical weight loss at this time. Will think about it. Is tired. Can't fall asleep. Tosses and turns all night long. Snores when he lies on his back. Was diagnosed with sleep apnea but could not tolerate the machine. Ok to see sleep medicine. Review of Systems   Constitutional: Negative for chills and fever. Respiratory: Negative for cough, shortness of breath and wheezing. Cardiovascular: Negative for chest pain, palpitations and leg swelling. Gastrointestinal: Negative for nausea and vomiting. Current Outpatient Medications on File Prior to Visit   Medication Sig Dispense Refill    ibuprofen (ADVIL;MOTRIN) 800 MG tablet Take 1 tablet by mouth 3 times daily (with meals) 90 tablet 5    lisinopril (PRINIVIL;ZESTRIL) 20 MG tablet Take 1 tablet by mouth daily 90 tablet 1    atorvastatin (LIPITOR) 10 MG tablet Take 1 tablet by mouth daily (Patient taking differently: Take 10 mg by mouth nightly ) 90 tablet 1    hydroCHLOROthiazide (HYDRODIURIL) 25 MG tablet TAKE 1 TABLET BY MOUTH DAILY 90 tablet 3    hydrocortisone 2.5 % cream Apply topically 2 times daily. 20 g 0     No current facility-administered medications on file prior to visit.       No Known Allergies Past medical, surgical, socialand/or family history reviewed, updated as needed, and are non-contributory (unless otherwise stated). Medications, allergies, and problem list also reviewed and updated as needed in patient's record. Wt Readings from Last 3 Encounters:   03/03/21 (!) 425 lb (192.8 kg)   02/05/21 (!) 400 lb (181.4 kg)   01/08/21 (!) 400 lb (181.4 kg)                   /80   Pulse 98   Temp 97 °F (36.1 °C)   Resp 20   Ht 5' 7\" (1.702 m)   Wt (!) 425 lb (192.8 kg)   SpO2 96%   BMI 66.56 kg/m²        Physical Exam  Vitals signs and nursing note reviewed. Constitutional:       General: He is not in acute distress. Appearance: He is well-developed. He is not diaphoretic. Comments: Obesity   HENT:      Head: Normocephalic and atraumatic. Cardiovascular:      Rate and Rhythm: Normal rate and regular rhythm. Heart sounds: Normal heart sounds. No murmur. Pulmonary:      Effort: Pulmonary effort is normal. No respiratory distress. Breath sounds: Normal breath sounds. No stridor. No wheezing or rales. Abdominal:      General: Bowel sounds are normal. There is no distension. Palpations: Abdomen is soft. Tenderness: There is no abdominal tenderness. There is no guarding or rebound. Musculoskeletal: Normal range of motion. General: No tenderness. Skin:     General: Skin is warm and dry.    Psychiatric:         Behavior: Behavior normal.       Results for orders placed or performed during the hospital encounter of 09/01/20   CBC   Result Value Ref Range    WBC 7.7 4.5 - 11.5 E9/L    RBC 5.67 3.80 - 5.80 E12/L    Hemoglobin 14.8 12.5 - 16.5 g/dL    Hematocrit 48.3 37.0 - 54.0 %    MCV 85.2 80.0 - 99.9 fL    MCH 26.1 26.0 - 35.0 pg    MCHC 30.6 (L) 32.0 - 34.5 %    RDW 13.8 11.5 - 15.0 fL    Platelets 640 395 - 722 E9/L    MPV 9.9 7.0 - 12.0 fL   LIPID PANEL   Result Value Ref Range    Cholesterol, Total 200 (H) 0 - 199 mg/dL Triglycerides 125 0 - 149 mg/dL    HDL 46 >40 mg/dL    LDL Calculated 129 (H) 0 - 99 mg/dL    VLDL Cholesterol Calculated 25 mg/dL   COMPREHENSIVE METABOLIC PANEL   Result Value Ref Range    Sodium 141 132 - 146 mmol/L    Potassium 4.9 3.5 - 5.0 mmol/L    Chloride 101 98 - 107 mmol/L    CO2 26 22 - 29 mmol/L    Anion Gap 14 7 - 16 mmol/L    Glucose 92 74 - 99 mg/dL    BUN 14 6 - 20 mg/dL    CREATININE 0.7 0.7 - 1.2 mg/dL    GFR Non-African American >60 >=60 mL/min/1.73    GFR African American >60     Calcium 9.6 8.6 - 10.2 mg/dL    Total Protein 7.6 6.4 - 8.3 g/dL    Albumin 4.3 3.5 - 5.2 g/dL    Total Bilirubin 0.7 0.0 - 1.2 mg/dL    Alkaline Phosphatase 66 40 - 129 U/L    ALT 20 0 - 40 U/L    AST 20 0 - 39 U/L       ASSESSMENT/PLAN  Kailyn Little was seen today for hypertension. Diagnoses and all orders for this visit:    MADDIE (obstructive sleep apnea)  -     Kyra Nieves MD, Sleep Medicine, [place]    Essential hypertension   - Controlled on current meds. Check labs today. Phone/MyChart follow up if tests abnormal.    Return in about 3 months (around 6/3/2021). or sooner if necessary. I have reviewed myfindings and recommendations with David Moody M.D

## 2021-03-04 LAB
ANION GAP SERPL CALCULATED.3IONS-SCNC: 6 MMOL/L (ref 7–16)
BUN BLDV-MCNC: 13 MG/DL (ref 6–20)
CALCIUM SERPL-MCNC: 9.8 MG/DL (ref 8.6–10.2)
CHLORIDE BLD-SCNC: 98 MMOL/L (ref 98–107)
CO2: 32 MMOL/L (ref 22–29)
CREAT SERPL-MCNC: 0.9 MG/DL (ref 0.7–1.2)
GFR AFRICAN AMERICAN: >60
GFR NON-AFRICAN AMERICAN: >60 ML/MIN/1.73
GLUCOSE BLD-MCNC: 96 MG/DL (ref 74–99)
POTASSIUM SERPL-SCNC: 5.3 MMOL/L (ref 3.5–5)
SODIUM BLD-SCNC: 136 MMOL/L (ref 132–146)

## 2021-03-05 DIAGNOSIS — E87.5 HYPERKALEMIA: Primary | ICD-10-CM

## 2021-03-15 DIAGNOSIS — E87.5 HYPERKALEMIA: ICD-10-CM

## 2021-03-15 LAB
ANION GAP SERPL CALCULATED.3IONS-SCNC: 14 MMOL/L (ref 7–16)
BUN BLDV-MCNC: 13 MG/DL (ref 6–20)
CALCIUM SERPL-MCNC: 9.6 MG/DL (ref 8.6–10.2)
CHLORIDE BLD-SCNC: 99 MMOL/L (ref 98–107)
CO2: 26 MMOL/L (ref 22–29)
CREAT SERPL-MCNC: 0.7 MG/DL (ref 0.7–1.2)
GFR AFRICAN AMERICAN: >60
GFR NON-AFRICAN AMERICAN: >60 ML/MIN/1.73
GLUCOSE BLD-MCNC: 89 MG/DL (ref 74–99)
POTASSIUM SERPL-SCNC: 4.7 MMOL/L (ref 3.5–5)
SODIUM BLD-SCNC: 139 MMOL/L (ref 132–146)

## 2021-06-01 DIAGNOSIS — E78.49 OTHER HYPERLIPIDEMIA: ICD-10-CM

## 2021-06-01 DIAGNOSIS — I10 ESSENTIAL HYPERTENSION: ICD-10-CM

## 2021-06-02 RX ORDER — ATORVASTATIN CALCIUM 10 MG/1
10 TABLET, FILM COATED ORAL NIGHTLY
Qty: 30 TABLET | Refills: 5 | Status: SHIPPED
Start: 2021-06-02 | End: 2022-06-13

## 2021-06-02 RX ORDER — LISINOPRIL 20 MG/1
20 TABLET ORAL DAILY
Qty: 90 TABLET | Refills: 1 | Status: SHIPPED
Start: 2021-06-02 | End: 2022-04-27 | Stop reason: SDUPTHER

## 2021-06-02 RX ORDER — HYDROCHLOROTHIAZIDE 25 MG/1
25 TABLET ORAL DAILY
Qty: 90 TABLET | Refills: 3 | Status: SHIPPED
Start: 2021-06-02 | End: 2022-09-08

## 2021-11-27 DIAGNOSIS — S46.912A STRAIN OF LEFT SHOULDER, INITIAL ENCOUNTER: ICD-10-CM

## 2021-11-29 RX ORDER — IBUPROFEN 800 MG/1
TABLET ORAL
Qty: 90 TABLET | Refills: 5 | Status: SHIPPED | OUTPATIENT
Start: 2021-11-29

## 2022-01-07 ENCOUNTER — OFFICE VISIT (OUTPATIENT)
Dept: FAMILY MEDICINE CLINIC | Age: 37
End: 2022-01-07
Payer: COMMERCIAL

## 2022-01-07 VITALS
OXYGEN SATURATION: 94 % | RESPIRATION RATE: 16 BRPM | SYSTOLIC BLOOD PRESSURE: 146 MMHG | DIASTOLIC BLOOD PRESSURE: 90 MMHG | TEMPERATURE: 97 F | HEART RATE: 73 BPM

## 2022-01-07 DIAGNOSIS — Z20.822 SUSPECTED COVID-19 VIRUS INFECTION: Primary | ICD-10-CM

## 2022-01-07 LAB
Lab: NORMAL
PERFORMING INSTRUMENT: NORMAL
QC PASS/FAIL: NORMAL
SARS-COV-2, POC: NORMAL

## 2022-01-07 PROCEDURE — 99213 OFFICE O/P EST LOW 20 MIN: CPT | Performed by: NURSE PRACTITIONER

## 2022-01-07 PROCEDURE — 87426 SARSCOV CORONAVIRUS AG IA: CPT | Performed by: NURSE PRACTITIONER

## 2022-01-07 NOTE — PROGRESS NOTES
Chief Complaint   Cough (x 1 week, wife positive covid), Congestion, and Fatigue      History of Present Illness   Source of history provided by: patient. Crissy Burns is a 43 y.o. old male who has a past medical history of:   Past Medical History:   Diagnosis Date    Hyperlipidemia     Hypertension     Presents to the [facility] clinic for evaluation of chest congestion, fatigue, sweating, cough dry x 3 days. States symptoms have been stable since onset. Denies any CP, dyspnea, LE edema, abdominal pain, vomiting, rash. Has been taking ibuprofen, coricidin HBP without symptomatic relief. hx of tobacco use. Patient is vaccinated against Covid, His wife was + Covid. ROS   Pertinent positives and negatives are stated within HPI, all other systems reviewed and are negative. Surgical History:  has a past surgical history that includes fracture surgery; Carpal tunnel release; and Arm Surgery (Right, 9/8/2020). Social History:  reports that he quit smoking about 12 years ago. His smoking use included cigarettes. He has a 5.00 pack-year smoking history. He uses smokeless tobacco. He reports current alcohol use. He reports that he does not use drugs. Family History: family history includes Atrial Fibrillation in his maternal grandfather; Cancer in his paternal grandmother; Cancer (age of onset: 46) in his father; Coronary Art Dis in his father; Diabetes in his father, mother, and paternal grandmother; Heart Attack in his maternal grandfather; High Blood Pressure in his sister; High Cholesterol in his father and mother. Allergies: Patient has no known allergies. Physical Exam      VS:  BP (!) 146/90   Pulse 73   Temp 97 °F (36.1 °C) (Temporal)   Resp 16   SpO2 94%        Constitutional:  Alert, development consistent with age. NAD. Morbidly obese  Head:  NC/NT. Airway patent. Ears: TMs clear bilaterally. Canals without exudate or swelling bilaterally.   Mouth: Posterior pharynx with mild erythema and clear postnasal drip. no tonsillar hypertrophy or exudate. Neck:  Normal ROM. Supple. No anterior cervical adenopathy noted. Lungs: CTAB without wheezes, rales, or rhonchi. CV:  Regular rate and rhythm, normal heart sounds, without pathological murmurs, ectopy, gallops, or rubs. Skin:  Normal turgor. Warm, dry, without visible rash. Lymphatic: No lymphangitis or adenopathy noted. Neurological:  Oriented. Motor functions intact. Lab / Imaging Results   (All laboratory and radiology results have been personally reviewed by myself)  Labs:  No results found for this visit on 01/07/22. Imaging: All Radiology results interpreted by Radiologist unless otherwise noted. No results found. Medical Decision Making   Pt non-toxic, in no apparent distress and stable at time of discharge. Assessment/Plan   Jerman Figueroa was seen today for cough, congestion and fatigue. Diagnoses and all orders for this visit:    Suspected COVID-19 virus infection  -     POCT COVID-19, Antigen  -     Covid-19 Ambulatory; Future    Discussed Monoclonial antibody infusion and he declines at this time. Check mychart for sent out results. Follow up with PCP if symptoms persist or worsen    If send out comes back +:   I would suggest quarantine from the onset of your positive test. With the Delta variant it can actually take up to 14 days to start with symptoms. There really isn't treatment but would recommend Zinc 50 mg daily, vitamin C 1000 mg twice a day and vitamin D3 2000 IU daily for the next month. You are contagious until you no longer have symptoms and are at least 24 hours without a fever without taking any tylenol or advil. I would also try to think about the people you have been in contact with and let them know you have covid. Usually if vaccinated you will not be as sick as if you weren't.  Just if you become really short of breath or a hard time breathing go to the ER as I have had several females develop blood clots in there lungs. Stay well hydrated. Jalene Schilder, APRN - CNP     A comprehensive review of all previous patient history and testing was not conducted. Pertinent findings were elicited during the visit.

## 2022-01-07 NOTE — PATIENT INSTRUCTIONS
The medication list included in this document is our record of what you are currently taking, including any changes that were made at today's visit.  If you find any differences when compared to your medications at home, or have any questions that were not answered at your visit, please contact the office. If + on send out:  I would suggest quarantine from the onset of your positive test. With the Delta variant it can actually take up to 14 days to start with symptoms. There really isn't treatment but would recommend Zinc 50 mg daily, vitamin C 1000 mg twice a day and vitamin D3 2000 IU daily for the next month. You are contagious until you no longer have symptoms and are at least 24 hours without a fever without taking any tylenol or advil. I would also try to think about the people you have been in contact with and let them know you have covid. Usually if vaccinated you will not be as sick as if you weren't. Just if you become really short of breath or a hard time breathing go to the ER as I have had several females develop blood clots in there lungs. Stay well hydrated.

## 2022-01-08 DIAGNOSIS — Z20.822 SUSPECTED COVID-19 VIRUS INFECTION: ICD-10-CM

## 2022-01-10 LAB
SARS-COV-2: NOT DETECTED
SOURCE: NORMAL

## 2022-04-15 ENCOUNTER — HOSPITAL ENCOUNTER (EMERGENCY)
Age: 37
Discharge: HOME | End: 2022-04-15
Payer: COMMERCIAL

## 2022-04-15 VITALS
BODY MASS INDEX: 49.44 KG/M2 | TEMPERATURE: 98.2 F | SYSTOLIC BLOOD PRESSURE: 165 MMHG | RESPIRATION RATE: 18 BRPM | HEIGHT: 67 IN | DIASTOLIC BLOOD PRESSURE: 100 MMHG | HEART RATE: 88 BPM | WEIGHT: 315 LBS | OXYGEN SATURATION: 99 %

## 2022-04-15 DIAGNOSIS — J02.9 ACUTE PHARYNGITIS, UNSPECIFIED ETIOLOGY: Primary | ICD-10-CM

## 2022-04-15 PROCEDURE — 99211 OFF/OP EST MAY X REQ PHY/QHP: CPT

## 2022-04-15 RX ORDER — PENICILLIN V POTASSIUM 500 MG/1
500 TABLET ORAL 4 TIMES DAILY
Qty: 40 TABLET | Refills: 0 | Status: SHIPPED | OUTPATIENT
Start: 2022-04-15 | End: 2022-04-25

## 2022-04-15 RX ORDER — BENZONATATE 100 MG/1
100 CAPSULE ORAL 3 TIMES DAILY PRN
Qty: 30 CAPSULE | Refills: 0 | Status: SHIPPED | OUTPATIENT
Start: 2022-04-15 | End: 2022-04-22

## 2022-04-15 RX ORDER — LIDOCAINE HYDROCHLORIDE 20 MG/ML
15 SOLUTION OROPHARYNGEAL
Qty: 100 ML | Refills: 0 | Status: SHIPPED | OUTPATIENT
Start: 2022-04-15

## 2022-04-15 ASSESSMENT — PAIN SCALES - GENERAL: PAINLEVEL_OUTOF10: 0

## 2022-04-15 ASSESSMENT — PAIN - FUNCTIONAL ASSESSMENT: PAIN_FUNCTIONAL_ASSESSMENT: 0-10

## 2022-04-15 NOTE — ED PROVIDER NOTES
Department of Emergency Medicine   ED  Provider Note  Admit Date/RoomTime: 4/15/2022  2:17 PM  ED Room: 04/04            Chief Complaint:  Cough (coughing up yellow/green mucous) and Pharyngitis      History of Present Illness:  Source of history provided by:  patient. History/Exam Limitations: none. Anneliese Francis is a 43 y.o. old male presenting to the emergency department for sore throat pain, which occured 2 day(s) prior to arrival.  Since onset the symptoms have been persistent. Denies fever, chills, chest pain, shortness of breath, difficulty swallowing, difficulty breathing, neck pain, neck stiffness, sick contacts, or rash. Exposed To: Streptococcus: no.                              Infectious Mononucleosis:  no.        Symptoms:  Pain:  Yes. Muffled Voice:  No.                            Hoarse:  No.                            Difficulty with Secretions:  No.       Associated Signs & Symptoms: otitis symptoms and URI symptoms. Review of Systems:      Pertinent positives and negatives are stated within HPI, all other systems reviewed and are negative. Past Medical History:  has a past medical history of Hyperlipidemia and Hypertension. Past Surgical History:  has a past surgical history that includes fracture surgery; Carpal tunnel release; and Arm Surgery (Right, 9/8/2020). Social History:  reports that he quit smoking about 12 years ago. His smoking use included cigarettes. He has a 5.00 pack-year smoking history. His smokeless tobacco use includes chew. He reports current alcohol use. He reports that he does not use drugs.   Family History: family history includes Atrial Fibrillation in his maternal grandfather; Cancer in his paternal grandmother; Cancer (age of onset: 46) in his father; Coronary Art Dis in his father; Diabetes in his father, mother, and paternal grandmother; Heart Attack in his maternal grandfather; High Blood Pressure in his sister; High Cholesterol in his father and mother. Allergies: Patient has no known allergies. Physical Exam:  Vital signs reviewed. Constitutional:  Alert, development consistent with age. Well appearing and non toxic and not distressed. Ears:  TMs without perforation, injection, or bulging. External canals clear without exudate. Mouth/Throat: Airway Patent. Floor of mouth soft. mild erythema and tonsillar hypertrophy, 2+. Handling secretions, no stridor, no evidence of airway compromise, no trismus. No visible abscess or PTA. Neck:  Supple, full ROM, no asymmetry, no meningeal signs. No stridor. There is no  anterior cervical and posterior cervical node tenderness. Lungs:  Clear to auscultation and breath sounds equal.    CV: Regular rate and rhythm, normal heart sounds, without pathological murmurs, ectopy, gallops, or rubs. Abdomen:  Soft, nontender, good bowel sounds. No organomegaly,   Skin:  Warm and dry, No rashes, no erythema present. Lymphatics: No lymphangitis. There is no  anterior cervical and posterior cervical node swelling and tenderness. Neurological:  GCS 15, Oriented. Motor functions intact.    -------------------Nursing Notes / Prior Records & Vitals Reviewed Section----------------------   (The nursing notes within the ED encounter, home medications, current encounter or past encounter records and vital signs as below have been reviewed)   BP (!) 165/100   Pulse 88   Temp 98.2 °F (36.8 °C) (Infrared)   Resp 18   Ht 5' 7\" (1.702 m)   Wt (!) 400 lb (181.4 kg)   SpO2 99%   BMI 62.65 kg/m²   Oxygen Saturation Interpretation: Normal.  -------------------------------------------Test Results Section---------------------------------------------  (All laboratory and radiology results have been personally reviewed by myself)  Laboratory:  No results found for this visit on 04/15/22. Radiology: All Radiology results interpreted by Radiologist unless otherwise noted.   No orders to display     -----------------------------ED Course / Medical Decision Making Section--------------------------  ED Course Medications:  Medications - No data to display    Medical Decision Making:    No evidence of PTA, RP abscess, epiglottitis, ludwigs angina, or other life threatening or deep space infection or airway compromise. At this time will initiate antibiotic therapy as his sons were recently ill with similar symptoms. Advised follow-up with PCP for recheck return emergency department with any new or worsening symptoms. Patient voiced understanding and is agreeable to the above treatment plan. Counseling: The emergency provider has spoken with the patient and discussed todays results, in addition to providing specific details for the plan of care and counseling regarding the diagnosis and prognosis. Questions are answered at this time and they are agreeable with the plan.  ------------------------------------Impression & Disposition Section------------------------------------  Impression(s):  1. Acute pharyngitis, unspecified etiology        Disposition:  Disposition: Discharge to home  Patient condition is stable    New Prescriptions    BENZONATATE (TESSALON) 100 MG CAPSULE    Take 1 capsule by mouth 3 times daily as needed for Cough    LIDOCAINE VISCOUS HCL (XYLOCAINE) 2 % SOLN SOLUTION    Take 15 mLs by mouth every 3 hours as needed for Irritation    PENICILLIN V POTASSIUM (VEETID) 500 MG TABLET    Take 1 tablet by mouth 4 times daily for 10 days     * NOTE: This report was transcribed using voice recognition software. Every effort was made to ensure accuracy; however, inadvertent computerized transcription errors may be present.            Isidro Dorseyma  04/15/22 2006

## 2022-04-20 ENCOUNTER — APPOINTMENT (OUTPATIENT)
Dept: GENERAL RADIOLOGY | Age: 37
End: 2022-04-20

## 2022-04-20 ENCOUNTER — HOSPITAL ENCOUNTER (EMERGENCY)
Age: 37
Discharge: LEFT BEFORE DISCHARGE | End: 2022-04-20

## 2022-04-20 VITALS
OXYGEN SATURATION: 99 % | SYSTOLIC BLOOD PRESSURE: 135 MMHG | DIASTOLIC BLOOD PRESSURE: 84 MMHG | RESPIRATION RATE: 20 BRPM | TEMPERATURE: 97.4 F | HEART RATE: 108 BPM

## 2022-04-20 PROCEDURE — 4500000002 HC ER NO CHARGE

## 2022-04-20 ASSESSMENT — LIFESTYLE VARIABLES: HOW OFTEN DO YOU HAVE A DRINK CONTAINING ALCOHOL: MONTHLY OR LESS

## 2022-04-21 ENCOUNTER — TELEPHONE (OUTPATIENT)
Dept: FAMILY MEDICINE CLINIC | Age: 37
End: 2022-04-21

## 2022-04-21 NOTE — TELEPHONE ENCOUNTER
Patient was in urgent care over the weekend with with cough, congestion, sore throat was given penicillin for 10 days and lidocaine with tessalon. Has been having trouble with ears and yesterday got really dizzy and had to catch himself. Went to emergency room but was too long of wait and had to leave. Was wanting to know what you can recommend or if can be seen in [place]?

## 2022-04-21 NOTE — ED NOTES
Department of Emergency Medicine  FIRST PROVIDER TRIAGE NOTE             Independent MLP           4/20/22  9:53 PM EDT    Date of Encounter: 4/20/22   MRN: [id number]      HPI: Gardner Shone is a 43 y.o. male who presents to the ED for cough, congestion, sore throat, started one week ago. Patient seen at urgent care 4/15 given tessalon, lidocaine viscous and penvk. improvement of sore throat, however today he is complaining of dizziness and generalized fatigue, weakness. ROS: Negative for fever. PE: Gen Appearance/Constitutional: alert  HEENT: NC/NT. PERRLA,  Airway patent. Musculoskeletal: moves all extremities x 4    Vitals:    04/20/22 2119   BP: 135/84   Pulse: 114   Temp: 97.4 °F (36.3 °C)   SpO2: 99%       Past medical history, surgical history, and medications reviewed. Initial Plan of Care: All treatment areas within department are currently occupied. Plan to order/initiate the following while awaiting opening in ED: imaging studies. Initiate treatment/testing, proceed to treatment area when bed available for ED Attending/MLP to continue care.     Electronically signed by MICHA Crabtree CNP   DD: 4/20/22         MICHA Crabtree CNP  04/20/22 [id number]

## 2022-04-27 DIAGNOSIS — I10 ESSENTIAL HYPERTENSION: ICD-10-CM

## 2022-04-27 RX ORDER — LISINOPRIL 20 MG/1
20 TABLET ORAL DAILY
Qty: 90 TABLET | Refills: 1 | Status: SHIPPED | OUTPATIENT
Start: 2022-04-27

## 2022-05-02 DIAGNOSIS — I10 ESSENTIAL HYPERTENSION: ICD-10-CM

## 2022-05-02 LAB
ALBUMIN SERPL-MCNC: 4.2 G/DL (ref 3.5–5.2)
ALP BLD-CCNC: 75 U/L (ref 40–129)
ALT SERPL-CCNC: 31 U/L (ref 0–40)
ANION GAP SERPL CALCULATED.3IONS-SCNC: 11 MMOL/L (ref 7–16)
AST SERPL-CCNC: 27 U/L (ref 0–39)
BASOPHILS ABSOLUTE: 0.06 E9/L (ref 0–0.2)
BASOPHILS RELATIVE PERCENT: 0.8 % (ref 0–2)
BILIRUB SERPL-MCNC: 0.5 MG/DL (ref 0–1.2)
BUN BLDV-MCNC: 13 MG/DL (ref 6–20)
CALCIUM SERPL-MCNC: 10 MG/DL (ref 8.6–10.2)
CHLORIDE BLD-SCNC: 98 MMOL/L (ref 98–107)
CHOLESTEROL, TOTAL: 255 MG/DL (ref 0–199)
CO2: 28 MMOL/L (ref 22–29)
CREAT SERPL-MCNC: 0.6 MG/DL (ref 0.7–1.2)
EOSINOPHILS ABSOLUTE: 0.38 E9/L (ref 0.05–0.5)
EOSINOPHILS RELATIVE PERCENT: 5.1 % (ref 0–6)
GFR AFRICAN AMERICAN: >60
GFR NON-AFRICAN AMERICAN: >60 ML/MIN/1.73
GLUCOSE BLD-MCNC: 119 MG/DL (ref 74–99)
HCT VFR BLD CALC: 49.5 % (ref 37–54)
HDLC SERPL-MCNC: 43 MG/DL
HEMOGLOBIN: 15.2 G/DL (ref 12.5–16.5)
IMMATURE GRANULOCYTES #: 0.02 E9/L
IMMATURE GRANULOCYTES %: 0.3 % (ref 0–5)
LDL CHOLESTEROL CALCULATED: 158 MG/DL (ref 0–99)
LYMPHOCYTES ABSOLUTE: 3.12 E9/L (ref 1.5–4)
LYMPHOCYTES RELATIVE PERCENT: 41.5 % (ref 20–42)
MCH RBC QN AUTO: 26.5 PG (ref 26–35)
MCHC RBC AUTO-ENTMCNC: 30.7 % (ref 32–34.5)
MCV RBC AUTO: 86.4 FL (ref 80–99.9)
MONOCYTES ABSOLUTE: 0.6 E9/L (ref 0.1–0.95)
MONOCYTES RELATIVE PERCENT: 8 % (ref 2–12)
NEUTROPHILS ABSOLUTE: 3.33 E9/L (ref 1.8–7.3)
NEUTROPHILS RELATIVE PERCENT: 44.3 % (ref 43–80)
PDW BLD-RTO: 14 FL (ref 11.5–15)
PLATELET # BLD: 355 E9/L (ref 130–450)
PMV BLD AUTO: 9.5 FL (ref 7–12)
POTASSIUM SERPL-SCNC: 5.1 MMOL/L (ref 3.5–5)
RBC # BLD: 5.73 E12/L (ref 3.8–5.8)
SODIUM BLD-SCNC: 137 MMOL/L (ref 132–146)
TOTAL PROTEIN: 7.9 G/DL (ref 6.4–8.3)
TRIGL SERPL-MCNC: 271 MG/DL (ref 0–149)
VLDLC SERPL CALC-MCNC: 54 MG/DL
WBC # BLD: 7.5 E9/L (ref 4.5–11.5)

## 2022-05-04 ENCOUNTER — OFFICE VISIT (OUTPATIENT)
Dept: FAMILY MEDICINE CLINIC | Age: 37
End: 2022-05-04
Payer: COMMERCIAL

## 2022-05-04 VITALS
OXYGEN SATURATION: 97 % | HEIGHT: 67 IN | RESPIRATION RATE: 20 BRPM | DIASTOLIC BLOOD PRESSURE: 90 MMHG | BODY MASS INDEX: 49.44 KG/M2 | WEIGHT: 315 LBS | SYSTOLIC BLOOD PRESSURE: 132 MMHG | HEART RATE: 104 BPM

## 2022-05-04 DIAGNOSIS — G47.33 OSA (OBSTRUCTIVE SLEEP APNEA): ICD-10-CM

## 2022-05-04 DIAGNOSIS — I10 ESSENTIAL HYPERTENSION: Primary | ICD-10-CM

## 2022-05-04 DIAGNOSIS — R73.9 HYPERGLYCEMIA: ICD-10-CM

## 2022-05-04 PROCEDURE — 99214 OFFICE O/P EST MOD 30 MIN: CPT | Performed by: FAMILY MEDICINE

## 2022-05-04 SDOH — ECONOMIC STABILITY: FOOD INSECURITY: WITHIN THE PAST 12 MONTHS, YOU WORRIED THAT YOUR FOOD WOULD RUN OUT BEFORE YOU GOT MONEY TO BUY MORE.: NEVER TRUE

## 2022-05-04 SDOH — ECONOMIC STABILITY: FOOD INSECURITY: WITHIN THE PAST 12 MONTHS, THE FOOD YOU BOUGHT JUST DIDN'T LAST AND YOU DIDN'T HAVE MONEY TO GET MORE.: NEVER TRUE

## 2022-05-04 ASSESSMENT — PATIENT HEALTH QUESTIONNAIRE - PHQ9
1. LITTLE INTEREST OR PLEASURE IN DOING THINGS: 0
SUM OF ALL RESPONSES TO PHQ QUESTIONS 1-9: 0
SUM OF ALL RESPONSES TO PHQ QUESTIONS 1-9: 0
SUM OF ALL RESPONSES TO PHQ9 QUESTIONS 1 & 2: 0
SUM OF ALL RESPONSES TO PHQ QUESTIONS 1-9: 0
SUM OF ALL RESPONSES TO PHQ QUESTIONS 1-9: 0
2. FEELING DOWN, DEPRESSED OR HOPELESS: 0

## 2022-05-04 ASSESSMENT — ENCOUNTER SYMPTOMS
SHORTNESS OF BREATH: 0
NAUSEA: 0
WHEEZING: 0
VOMITING: 0
COUGH: 0

## 2022-05-04 ASSESSMENT — LIFESTYLE VARIABLES: HOW OFTEN DO YOU HAVE A DRINK CONTAINING ALCOHOL: MONTHLY OR LESS

## 2022-05-04 NOTE — PATIENT INSTRUCTIONS
Get debrox drops for your ears  Stop using qtips   Continue all meds   Get repeat blood work   Get sleep study   Follow up in 4 months     Joanie [address] [facility]  Mariely Quintero 63  Tel: [phone]

## 2022-05-04 NOTE — PROGRESS NOTES
[facility]  [phone]   Jesse Davies MD     Patient: Davon Gonzalez  YOB: 1985  Visit Date: 5/4/22    Chandler Gorman is a 43y.o. year old male here today for   Chief Complaint   Patient presents with    Hypertension     i roomed your last patient !!!!       HPI  Patient is a 43year old male here for follow up of hypertension and high cholesterol. Had been out of lipitor   Taking all other meds. No chest pain or shortness of breath. Taking all meds with no issues. Sleep is ok when the baby sleeps. Snores at night. Does not think he stops breathing . Wife has told him that he has apneic episodes when lying on his back. Willing to try again if its a different mask     Review of Systems   Constitutional: Negative for chills and fever. Respiratory: Negative for cough, shortness of breath and wheezing. Cardiovascular: Negative for chest pain, palpitations and leg swelling. Gastrointestinal: Negative for nausea and vomiting. Psychiatric/Behavioral: Positive for sleep disturbance. Current Outpatient Medications on File Prior to Visit   Medication Sig Dispense Refill    lisinopril (PRINIVIL;ZESTRIL) 20 MG tablet Take 1 tablet by mouth daily 90 tablet 1    lidocaine viscous hcl (XYLOCAINE) 2 % SOLN solution Take 15 mLs by mouth every 3 hours as needed for Irritation 100 mL 0    ibuprofen (ADVIL;MOTRIN) 800 MG tablet TAKE 1 TABLET BY MOUTH THREE TIMES DAILY WITH MEALS 90 tablet 5    atorvastatin (LIPITOR) 10 MG tablet Take 1 tablet by mouth nightly 30 tablet 5    hydroCHLOROthiazide (HYDRODIURIL) 25 MG tablet TAKE 1 TABLET BY MOUTH DAILY 90 tablet 3    hydrocortisone 2.5 % cream Apply topically 2 times daily. 20 g 0     No current facility-administered medications on file prior to visit.      No Known Allergies    Past medical, surgical, socialand/or family history reviewed, updated as needed, and are non-contributory (unless otherwise stated). Medications, allergies, and problem list also reviewed and updated as needed in patient's record. Wt Readings from Last 3 Encounters:   05/04/22 (!) 438 lb (198.7 kg)   04/15/22 (!) 400 lb (181.4 kg)   03/03/21 (!) 425 lb (192.8 kg)                   BP (!) 132/90   Pulse 104   Resp 20   Ht 5' 7\" (1.702 m)   Wt (!) 438 lb (198.7 kg)   SpO2 97%   BMI 68.60 kg/m²        Physical Exam  Vitals and nursing note reviewed. Constitutional:       General: He is not in acute distress. Appearance: He is well-developed. He is not diaphoretic. Comments: Obesity   HENT:      Head: Normocephalic and atraumatic. Cardiovascular:      Rate and Rhythm: Normal rate and regular rhythm. Heart sounds: Normal heart sounds. No murmur heard. Pulmonary:      Effort: Pulmonary effort is normal. No respiratory distress. Breath sounds: Normal breath sounds. No stridor. No wheezing or rales. Abdominal:      General: Bowel sounds are normal. There is no distension. Palpations: Abdomen is soft. Tenderness: There is no abdominal tenderness. There is no guarding or rebound. Musculoskeletal:         General: No tenderness. Normal range of motion. Skin:     General: Skin is warm and dry.    Psychiatric:         Behavior: Behavior normal.       Results for orders placed or performed in visit on 05/02/22   CBC with Auto Differential   Result Value Ref Range    WBC 7.5 4.5 - 11.5 E9/L    RBC 5.73 3.80 - 5.80 E12/L    Hemoglobin 15.2 12.5 - 16.5 g/dL    Hematocrit 49.5 37.0 - 54.0 %    MCV 86.4 80.0 - 99.9 fL    MCH 26.5 26.0 - 35.0 pg    MCHC 30.7 (L) 32.0 - 34.5 %    RDW 14.0 11.5 - 15.0 fL    Platelets 122 561 - 430 E9/L    MPV 9.5 7.0 - 12.0 fL    Neutrophils % 44.3 43.0 - 80.0 %    Immature Granulocytes % 0.3 0.0 - 5.0 %    Lymphocytes % 41.5 20.0 - 42.0 %    Monocytes % 8.0 2.0 - 12.0 %    Eosinophils % 5.1 0.0 - 6.0 %    Basophils % 0.8 0.0 - 2.0 % Neutrophils Absolute 3.33 1.80 - 7.30 E9/L    Immature Granulocytes # 0.02 E9/L    Lymphocytes Absolute 3.12 1.50 - 4.00 E9/L    Monocytes Absolute 0.60 0.10 - 0.95 E9/L    Eosinophils Absolute 0.38 0.05 - 0.50 E9/L    Basophils Absolute 0.06 0.00 - 0.20 E9/L   Comprehensive Metabolic Panel   Result Value Ref Range    Sodium 137 132 - 146 mmol/L    Potassium 5.1 (H) 3.5 - 5.0 mmol/L    Chloride 98 98 - 107 mmol/L    CO2 28 22 - 29 mmol/L    Anion Gap 11 7 - 16 mmol/L    Glucose 119 (H) 74 - 99 mg/dL    BUN 13 6 - 20 mg/dL    CREATININE 0.6 (L) 0.7 - 1.2 mg/dL    GFR Non-African American >60 >=60 mL/min/1.73    GFR African American >60     Calcium 10.0 8.6 - 10.2 mg/dL    Total Protein 7.9 6.4 - 8.3 g/dL    Albumin 4.2 3.5 - 5.2 g/dL    Total Bilirubin 0.5 0.0 - 1.2 mg/dL    Alkaline Phosphatase 75 40 - 129 U/L    ALT 31 0 - 40 U/L    AST 27 0 - 39 U/L   LIPID PANEL   Result Value Ref Range    Cholesterol, Total 255 (H) 0 - 199 mg/dL    Triglycerides 271 (H) 0 - 149 mg/dL    HDL 43 >40 mg/dL    LDL Calculated 158 (H) 0 - 99 mg/dL    VLDL Cholesterol Calculated 54 mg/dL       ASSESSMENT/PLAN  Richa Guevara was seen today for hypertension. Diagnoses and all orders for this visit:    Essential hypertension  -     Basic Metabolic Panel; Future\  Mildly elevated potassium - recheck bmp . Continue hctz and lisinopril for now. HANS (obstructive sleep apnea)  -     Baseline Diagnostic Sleep Study; Future  -     [address] [place] [address] B, DO, Sleep Medicine, [place]  Diagnosed with hans in the past but did not like mask . We discussed risks of untreated HANS and patient agreed to have this done again. Hyperglycemia  -     Hemoglobin A1C; Future            Phone/MyChart follow up if tests abnormal.    No follow-ups on file. or sooner if necessary. I have reviewed myfindings and recommendations with Sally Ellison MD, M.D

## 2022-05-16 ENCOUNTER — TELEPHONE (OUTPATIENT)
Dept: SLEEP CENTER | Age: 37
End: 2022-05-16

## 2022-05-23 ENCOUNTER — TELEPHONE (OUTPATIENT)
Dept: SLEEP CENTER | Age: 37
End: 2022-05-23

## 2022-06-12 DIAGNOSIS — E78.49 OTHER HYPERLIPIDEMIA: ICD-10-CM

## 2022-06-13 RX ORDER — ATORVASTATIN CALCIUM 10 MG/1
TABLET, FILM COATED ORAL
Qty: 30 TABLET | Refills: 5 | Status: SHIPPED | OUTPATIENT
Start: 2022-06-13

## 2022-06-28 ENCOUNTER — TELEPHONE (OUTPATIENT)
Dept: SLEEP CENTER | Age: 37
End: 2022-06-28

## 2022-06-28 NOTE — TELEPHONE ENCOUNTER
Call to pt to reschedule 6-28-22 appt. D/t NP n/a.  LM with call back # and that appt would be cancelled

## 2022-09-08 DIAGNOSIS — I10 ESSENTIAL HYPERTENSION: ICD-10-CM

## 2022-09-08 RX ORDER — HYDROCHLOROTHIAZIDE 25 MG/1
25 TABLET ORAL DAILY
Qty: 90 TABLET | Refills: 3 | Status: SHIPPED | OUTPATIENT
Start: 2022-09-08

## 2022-09-09 NOTE — TELEPHONE ENCOUNTER
Spoke with patient informed that prescription was approved and sent to pharmacy. Informed patient that there is lab orders that need to be done.

## 2022-12-18 DIAGNOSIS — I10 ESSENTIAL HYPERTENSION: ICD-10-CM

## 2022-12-19 RX ORDER — LISINOPRIL 20 MG/1
20 TABLET ORAL DAILY
Qty: 30 TABLET | Refills: 1 | Status: SHIPPED | OUTPATIENT
Start: 2022-12-19

## 2022-12-22 NOTE — TELEPHONE ENCOUNTER
Message left informed patient that prescription was approved and sent to pharmacy. Informed patient that labs need done prior to any more refills.

## 2023-01-20 ENCOUNTER — OFFICE VISIT (OUTPATIENT)
Dept: FAMILY MEDICINE CLINIC | Age: 38
End: 2023-01-20
Payer: COMMERCIAL

## 2023-01-20 VITALS
TEMPERATURE: 97 F | DIASTOLIC BLOOD PRESSURE: 97 MMHG | SYSTOLIC BLOOD PRESSURE: 143 MMHG | RESPIRATION RATE: 18 BRPM | BODY MASS INDEX: 47.74 KG/M2 | OXYGEN SATURATION: 95 % | HEIGHT: 68 IN | HEART RATE: 101 BPM | WEIGHT: 315 LBS

## 2023-01-20 DIAGNOSIS — Z80.0 FAMILY HISTORY OF COLON CANCER: ICD-10-CM

## 2023-01-20 DIAGNOSIS — K64.4 EXTERNAL HEMORRHOID: Primary | ICD-10-CM

## 2023-01-20 DIAGNOSIS — Z12.11 ENCOUNTER FOR SCREENING FOR MALIGNANT NEOPLASM OF COLON: ICD-10-CM

## 2023-01-20 PROCEDURE — 99212 OFFICE O/P EST SF 10 MIN: CPT | Performed by: FAMILY MEDICINE

## 2023-01-20 RX ORDER — DOCUSATE SODIUM 100 MG/1
100 CAPSULE, LIQUID FILLED ORAL 2 TIMES DAILY
Qty: 60 CAPSULE | Refills: 0 | Status: SHIPPED | OUTPATIENT
Start: 2023-01-20 | End: 2023-02-19

## 2023-01-20 SDOH — ECONOMIC STABILITY: FOOD INSECURITY: WITHIN THE PAST 12 MONTHS, YOU WORRIED THAT YOUR FOOD WOULD RUN OUT BEFORE YOU GOT MONEY TO BUY MORE.: SOMETIMES TRUE

## 2023-01-20 SDOH — ECONOMIC STABILITY: FOOD INSECURITY: WITHIN THE PAST 12 MONTHS, THE FOOD YOU BOUGHT JUST DIDN'T LAST AND YOU DIDN'T HAVE MONEY TO GET MORE.: SOMETIMES TRUE

## 2023-01-20 ASSESSMENT — SOCIAL DETERMINANTS OF HEALTH (SDOH): HOW HARD IS IT FOR YOU TO PAY FOR THE VERY BASICS LIKE FOOD, HOUSING, MEDICAL CARE, AND HEATING?: NOT VERY HARD

## 2023-01-20 ASSESSMENT — LIFESTYLE VARIABLES
HOW OFTEN DO YOU HAVE A DRINK CONTAINING ALCOHOL: MONTHLY OR LESS
HOW MANY STANDARD DRINKS CONTAINING ALCOHOL DO YOU HAVE ON A TYPICAL DAY: 5 OR 6

## 2023-01-20 ASSESSMENT — PATIENT HEALTH QUESTIONNAIRE - PHQ9
SUM OF ALL RESPONSES TO PHQ QUESTIONS 1-9: 0
SUM OF ALL RESPONSES TO PHQ9 QUESTIONS 1 & 2: 0
SUM OF ALL RESPONSES TO PHQ QUESTIONS 1-9: 0
2. FEELING DOWN, DEPRESSED OR HOPELESS: 0
1. LITTLE INTEREST OR PLEASURE IN DOING THINGS: 0

## 2023-01-20 NOTE — PROGRESS NOTES
This is a 64-year-old male in office for rectal burning ongoing x1 week. Does not recall any specific inciting event for the symptoms. Does not have a previous history of the similar symptoms. Patient notes it could possibly be hemorrhoids. Has not noticed any blood in the stool, has noted alternating bowel habits including constipation/diarrhea. No pencil thin stools. No systemic symptoms such as fever, chills, and/V, poor appetite or abnormal weight changes that were unintentional.  Patient is also notes a strong family history of colon cancer, he notes approximately 4 first-degree relatives have had an been diagnosed with colon cancer. He notes the youngest is 52 and has a first-degree cousin. Blood pressure (!) 143/97, pulse (!) 101, temperature 97 °F (36.1 °C), temperature source Temporal, resp. rate 18, height 5' 8\" (1.727 m), weight (!) 432 lb (196 kg), SpO2 95 %. HEENT WNL     Heart regular    Lungs clear    abd non-tender      No edema    Pulses intact   There appears to be a moderately sized hemorrhoid at the 9 o'clock position from my perspective, 3 o'clock position from patient's perspective. Does not appear to be thrombosed. Nontender to palpation. FOBT is negative    A/P    External hemorrhoid  - Conservative measures at this time recommended such as sitz bath, topical preparations and rectal suppositories. We will also add a stool softener as well such as Colace for additional assistance. Given the patient does have a strong family history of colon cancer, will have him evaluated by general surgery as well for both concerns including hemorrhoid and colon cancer family history. ASSESSMENT:  Attending Physician Statement  I have discussed the case, including pertinent history and exam findings with the resident. I agree with the documented assessment and plan.

## 2023-01-20 NOTE — PROGRESS NOTES
[facility]  Department of Family Medicine  Family Medicine Residency Program      Patient:  Mendel Jones 42 y.o. male     Date of Service: 1/20/23      Chiefcomplaint:   Chief Complaint   Patient presents with    Rectal Pain     Patient states he believes he has a flaming Hemorid. History of Present Illness     This is a 75-year-old male in office for rectal burning ongoing x1 week. Does not recall any specific inciting event for the symptoms. Does not have a previous history of the similar symptoms. Patient notes it could possibly be hemorrhoids. Has not noticed any blood in the stool, has noted alternating bowel habits including constipation/diarrhea. No pencil thin stools. No systemic symptoms such as fever, chills, and/V, poor appetite or abnormal weight changes that were unintentional.  Patient endorses a strong family history of colon cancer. He states that approximately 4 of his first-degree relatives have been diagnosed with colon cancer. He notes the youngest is a 77-year-old cousin and that is first-degree. Allergies:    Patient has no known allergies. Medication List:    Current Outpatient Medications   Medication Sig Dispense Refill    lisinopril (PRINIVIL;ZESTRIL) 20 MG tablet TAKE 1 TABLET BY MOUTH DAILY 30 tablet 1    hydroCHLOROthiazide (HYDRODIURIL) 25 MG tablet TAKE 1 TABLET BY MOUTH DAILY 90 tablet 3    atorvastatin (LIPITOR) 10 MG tablet TAKE 1 TABLET BY MOUTH EVERY NIGHT 30 tablet 5    lidocaine viscous hcl (XYLOCAINE) 2 % SOLN solution Take 15 mLs by mouth every 3 hours as needed for Irritation 100 mL 0    ibuprofen (ADVIL;MOTRIN) 800 MG tablet TAKE 1 TABLET BY MOUTH THREE TIMES DAILY WITH MEALS 90 tablet 5    hydrocortisone 2.5 % cream Apply topically 2 times daily. 20 g 0     No current facility-administered medications for this visit. Review of Systems   Constitutional:  Negative for chills and fever.    Respiratory:  Negative for cough, shortness of breath and wheezing. Cardiovascular:  Negative for chest pain. Gastrointestinal:  Negative for abdominal pain, diarrhea and vomiting. Musculoskeletal:  Negative for back pain and neck pain. Skin:  Negative for color change. Neurological:  Negative for dizziness, tremors, weakness and light-headedness. Physical Exam   Physical Exam  Constitutional:       Appearance: She is well-developed. HENT:      Head: Normocephalic. Right Ear: External ear normal.      Left Ear: External ear normal.   Cardiovascular:      Rate and Rhythm: Normal rate and regular rhythm. Heart sounds: Normal heart sounds. No murmur heard. Pulmonary:      Effort: Pulmonary effort is normal.      Breath sounds: Normal breath sounds. No wheezing. Abdominal:      Palpations: Abdomen is soft. Tenderness: There is no abdominal tenderness. Musculoskeletal:         General: Normal range of motion. Cervical back: Normal range of motion. Skin:     General: Skin is warm. Findings: No erythema. Neurological:      Mental Status: She is alert and oriented to person, place, and time. Psychiatric:         Behavior: Behavior normal.     RECTAL EXAM-there appears to be a moderately sized hemorrhoid approximately 2.5 cm x 1.5 cm, circumferential.  It was not tender to palpation. It did not appear to have any active bleeding, was not darkly pigmented or with appearance of thrombosis. It was located at the 9 o'clock position from my perspective. FOBT was negative    Vitals:    01/20/23 0938   BP: (!) 143/97   Pulse: (!) 101   Resp:    Temp:    SpO2:          Assessment and Plan     External hemorrhoid  - Conservative measures at this time recommended such as sitz bath, topical preparations and rectal suppositories. We will also add a stool softener as well such as Colace for additional assistance.   Given the patient does have a strong family history of colon cancer, will have him evaluated by general surgery as well for both concerns including hemorrhoid and colon cancer family history.       RTO  1 month  Case discussed with Dr. Carloz Carr

## 2023-02-02 ENCOUNTER — OFFICE VISIT (OUTPATIENT)
Dept: FAMILY MEDICINE CLINIC | Age: 38
End: 2023-02-02
Payer: COMMERCIAL

## 2023-02-02 VITALS
TEMPERATURE: 97.4 F | WEIGHT: 315 LBS | HEART RATE: 95 BPM | SYSTOLIC BLOOD PRESSURE: 140 MMHG | OXYGEN SATURATION: 95 % | DIASTOLIC BLOOD PRESSURE: 90 MMHG | BODY MASS INDEX: 68.12 KG/M2

## 2023-02-02 DIAGNOSIS — R20.2 NUMBNESS AND TINGLING IN LEFT HAND: Primary | ICD-10-CM

## 2023-02-02 DIAGNOSIS — R20.0 NUMBNESS AND TINGLING IN LEFT HAND: Primary | ICD-10-CM

## 2023-02-02 PROCEDURE — 99212 OFFICE O/P EST SF 10 MIN: CPT | Performed by: FAMILY MEDICINE

## 2023-02-02 PROCEDURE — 3074F SYST BP LT 130 MM HG: CPT | Performed by: FAMILY MEDICINE

## 2023-02-02 PROCEDURE — 99213 OFFICE O/P EST LOW 20 MIN: CPT | Performed by: FAMILY MEDICINE

## 2023-02-02 PROCEDURE — 3078F DIAST BP <80 MM HG: CPT | Performed by: FAMILY MEDICINE

## 2023-02-02 SDOH — ECONOMIC STABILITY: HOUSING INSECURITY
IN THE LAST 12 MONTHS, WAS THERE A TIME WHEN YOU DID NOT HAVE A STEADY PLACE TO SLEEP OR SLEPT IN A SHELTER (INCLUDING NOW)?: NO

## 2023-02-02 SDOH — ECONOMIC STABILITY: INCOME INSECURITY: HOW HARD IS IT FOR YOU TO PAY FOR THE VERY BASICS LIKE FOOD, HOUSING, MEDICAL CARE, AND HEATING?: NOT HARD AT ALL

## 2023-02-02 SDOH — ECONOMIC STABILITY: FOOD INSECURITY: WITHIN THE PAST 12 MONTHS, YOU WORRIED THAT YOUR FOOD WOULD RUN OUT BEFORE YOU GOT MONEY TO BUY MORE.: NEVER TRUE

## 2023-02-02 SDOH — ECONOMIC STABILITY: FOOD INSECURITY: WITHIN THE PAST 12 MONTHS, THE FOOD YOU BOUGHT JUST DIDN'T LAST AND YOU DIDN'T HAVE MONEY TO GET MORE.: NEVER TRUE

## 2023-02-02 NOTE — PROGRESS NOTES
Cesario Villasenor 42 y.o. male PMH of hyperlipidemia and history of carpal tunnel (release in 2015) who presents today for left fourth and fifth digit numbness    Left fourth and fifth digit numbness  Patient states that symptoms have been happening for approximately a month. Denies known trauma to left upper extremity. Note initially he was having intermittent numbness of his foot and fifth digits both ventral and dorsal aspect. Though over the past week and 1/2 to 2 weeks he has noticed that he had chronic or worsening numbness that is always there  States that it does not worsen with movement, does not improve with rest  Of note patient does have a history of a carpal tunnel release of the left hand completed by Dr. 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 ROS: Remarkable for weight of 448 pounds with a BMI of 68.12.    O: VS: BP (!) 140/90   Pulse 95   Temp 97.4 °F (36.3 °C) (Temporal)   Wt (!) 448 lb (203.2 kg)   SpO2 95%   BMI 68.12 kg/m²    General: NAD   CV:  RRR, no gallops, rubs, or murmurs   Resp: CTAB no R/R/W   Abd:  Soft, nontender, no masses    Ext: Remarkable for reproduction of symptoms with palpation and tapping of lateral epicondyles and the ulnar region.     Assessment / Plan:      Lonnie Weaver was seen today for other.    Diagnoses and all orders for this visit:    1. Numbness and tingling in left hand  Differential currently includes medial epicondylitis with ulnar neuropathy versus cubital tunnel syndrome  At this time, utilizing conservative measures including reapplication of thumb spica splint at nighttime, and monitoring with pressure off of the medial epicondyle  Anatomy of the region with nerve impingement explained to patient  Readings provided  Utilize ibuprofen for pain as required  If not improving with conservative measures, can consider EMG, utilization of gabapentin   on weight loss as this may also help improve any impingement and entrapment and overall morbidity/mortality     RTO 1 month or sooner prn for any persistent, new, or worsening symptoms. Assessment/Plan:  1. Numbness of left-sided fourth and fifth digits: Differential diagnosis includes medial epicondylitis with an ulnar neuropathy versus cubital tunnel syndrome. Conservative measures to include resuming wrist splint; counseling on weight loss; verbal and written information regarding the management of ulnar maculopathy, cubital tunnel syndrome, and medial epicondylitis provided. Use of ibuprofen as needed. Return in about 4 weeks (around 3/2/2023) for Follow up. Follow-up in approximately 4 to 6 weeks to assess response of symptoms to the above treatment    Attending Physician Statement  I have discussed the case, including pertinent history and exam findings with the resident. I agree with the documented assessment and plan.          Bubba Escalante MD

## 2023-02-02 NOTE — PROGRESS NOTES
[facility]  Department of [facility]  Family Medicine Residency Program    Date of Visit: 2023     Chief Complaint     Chief Complaint   Patient presents with    Other     Pt c/o numbness on left had in the pinky and ringer finger for almost a month. Prior hx of surgery. History of Present Illness     HPI:  42 y.o. male with PMH of essential hypertension, MADDIE, history of carpal tunnel status post surgical release who presents for same-day appointment for worsening left hand numbness    Left hand numbness (particularly fourth and fifth digits)  Patient states that the pain/numbness has been worsening for approximately a month now  Initially for the past 1 to 2 weeks, symptoms were on and off, though now for the last week and a half, he feels that the numbness has been more constant. Describes the numbness and tingling more in his fourth and fifth digits on both the ventral and dorsal aspects of his left hand. 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   Patient is right-hand dominant  Of note, patient does have history of carpal tunnel, with a carpal tunnel release in 2015    No other concerns today    Social History     Tobacco Use    Smoking status: Former     Packs/day: 0.50     Years: 10.00     Pack years: 5.00     Types: Cigarettes     Quit date: 2010     Years since quittin.0    Smokeless tobacco: Current     Types: Chew Tobacco comments:     patient quit   Vaping Use    Vaping Use: Never used   Substance Use Topics    Alcohol use: Yes     Comment: occasionally    Drug use: No       ROS:    Reviewed, pertinent as above otherwise negative    Objective:    VS:  Blood pressure (!) 140/90, pulse 95, temperature 97.4 °F (36.3 °C), temperature source Temporal, weight (!) 448 lb (203.2 kg), SpO2 95 %. Physical Exam  Constitutional:       Appearance: He is obese. He is not ill-appearing. Cardiovascular:      Rate and Rhythm: Normal rate and regular rhythm. Pulses: Normal pulses. Heart sounds: Normal heart sounds. No murmur heard. No gallop. Pulmonary:      Effort: Pulmonary effort is normal. No respiratory distress. Breath sounds: Normal breath sounds. No wheezing. Abdominal:      General: Bowel sounds are normal. There is no distension. Palpations: There is no mass. Tenderness: There is no abdominal tenderness. Hernia: No hernia is present. Musculoskeletal:         General: No swelling or tenderness. Right lower leg: No edema. Left lower leg: No edema. Neurological:      Mental Status: He is alert. Cranial Nerves: No cranial nerve deficit. Sensory: Sensory deficit present. Motor: No weakness. Deep Tendon Reflexes: Reflexes normal.      Comments: Limited sensation to pain and soft touch of fourth and fifth digit of left hand. Negative Tinel's and Phalen's. Tingling sensation/numbness reproducible on palpation and tapping of the lateral epicondyles and ulnar region. Assessment/Plan:    1.  Numbness and tingling in left hand  Differential currently includes medial epicondylitis with ulnar neuropathy versus cubital tunnel syndrome  At this time, utilizing conservative measures including reapplication of thumb spica splint at nighttime, and monitoring with pressure off of the medial epicondyle  Anatomy of the region with nerve impingement explained to patient  Readings provided  Utilize ibuprofen for pain as required  If not improving with conservative measures, can consider EMG, utilization of gabapentin   on weight loss as this may also help improve any impingement and entrapment and overall morbidity/mortality    RTO 1 month or sooner prn for any persistent, new, or worsening symptoms. Please see Patient Instructions for further counseling and information given. Advised to please be adherent to the treatment plans discussed today, and please call with any questions or concerns, letting the office know of any reasons that the plans may not be followed. The risks of untreated conditions include worsening illness, injury, disability, and possibly, death. Please call if symptoms change in any way, worsen, or fail to completely resolve, as this could necessitate a change to treatment plans. Patient and/or caregiver expressed understanding. Indications and proper use of medication(s) reviewed. Potential side-effects and risks of medication(s) also explained. Patient and/or caregiver was instructed to call if any new symptoms develop prior to next visit. Health risk factors discussed and addressed.      Electronically signed by Debra Marcus MD PGY-3 on 2/2/2023 at 2:08 PM  This case was discussed with attending physician: Dr. Artemio Solano

## 2023-02-05 DIAGNOSIS — S46.912A STRAIN OF LEFT SHOULDER, INITIAL ENCOUNTER: ICD-10-CM

## 2023-02-06 RX ORDER — IBUPROFEN 800 MG/1
800 TABLET ORAL EVERY 8 HOURS PRN
Qty: 90 TABLET | Refills: 2 | Status: SHIPPED | OUTPATIENT
Start: 2023-02-06

## 2023-04-28 ENCOUNTER — OFFICE VISIT (OUTPATIENT)
Dept: FAMILY MEDICINE CLINIC | Age: 38
End: 2023-04-28
Payer: COMMERCIAL

## 2023-04-28 VITALS
DIASTOLIC BLOOD PRESSURE: 78 MMHG | HEART RATE: 86 BPM | WEIGHT: 315 LBS | BODY MASS INDEX: 47.74 KG/M2 | HEIGHT: 68 IN | SYSTOLIC BLOOD PRESSURE: 134 MMHG | OXYGEN SATURATION: 96 % | TEMPERATURE: 97 F

## 2023-04-28 DIAGNOSIS — Z71.85 VACCINE COUNSELING: ICD-10-CM

## 2023-04-28 DIAGNOSIS — E78.5 HYPERLIPIDEMIA, UNSPECIFIED HYPERLIPIDEMIA TYPE: ICD-10-CM

## 2023-04-28 DIAGNOSIS — G56.22 CUBITAL TUNNEL SYNDROME ON LEFT: Primary | ICD-10-CM

## 2023-04-28 DIAGNOSIS — Z71.82 EXERCISE COUNSELING: ICD-10-CM

## 2023-04-28 DIAGNOSIS — I10 PRIMARY HYPERTENSION: ICD-10-CM

## 2023-04-28 DIAGNOSIS — Z71.3 DIETARY COUNSELING: ICD-10-CM

## 2023-04-28 DIAGNOSIS — Z76.89 ESTABLISHING CARE WITH NEW DOCTOR, ENCOUNTER FOR: ICD-10-CM

## 2023-04-28 DIAGNOSIS — G47.33 OSA (OBSTRUCTIVE SLEEP APNEA): ICD-10-CM

## 2023-04-28 PROCEDURE — 3075F SYST BP GE 130 - 139MM HG: CPT | Performed by: SURGERY

## 2023-04-28 PROCEDURE — 3078F DIAST BP <80 MM HG: CPT | Performed by: SURGERY

## 2023-04-28 PROCEDURE — 99214 OFFICE O/P EST MOD 30 MIN: CPT | Performed by: SURGERY

## 2023-04-28 RX ORDER — GABAPENTIN 300 MG/1
300 CAPSULE ORAL
Qty: 90 CAPSULE | Refills: 0 | Status: SHIPPED | OUTPATIENT
Start: 2023-04-28 | End: 2023-10-25

## 2023-04-28 SDOH — HEALTH STABILITY: PHYSICAL HEALTH: ON AVERAGE, HOW MANY DAYS PER WEEK DO YOU ENGAGE IN MODERATE TO STRENUOUS EXERCISE (LIKE A BRISK WALK)?: 7 DAYS

## 2023-04-28 NOTE — PROGRESS NOTES
Michael Alves (:  1985) is a 42 y.o. male,Established patient, here for evaluation of the following chief complaint(s):  New Patient, Establish Care, and Numbness (Left hand ring and little finger numbness x 4 months.)         ASSESSMENT/PLAN:  1. Cubital tunnel syndrome on left  -     DME Order for (Specify) as OP  -     gabapentin (NEURONTIN) 300 MG capsule; Take 1 capsule by mouth nightly for 180 days. Intended supply: 30 days, Disp-90 capsule, R-0Normal  -     EMG; Future  -     CBC with Auto Differential; Future  -     Comprehensive Metabolic Panel; Future  -     TSH; Future  -     T4, Free; Future  2. Body mass index (BMI) 60.0-69.9, adult ([facility] [address]  3. Hyperlipidemia, unspecified hyperlipidemia type  -     Lipid, Fasting; Future  4. MADDIE (obstructive sleep apnea)  Will get him new sleep study  He will call to see if he can get rescheduled  Run into issues, call us and we will facilitate new referrals   5. Primary hypertension  Need lytes checked  Normotensive today  Stable  No changed    6. Dietary counseling  Counseled the patient on diet, continue to make positive choices. It is important to focus on meeting food group needs with nutrient dense foods and stay within caloric limits. Nutrient dense foods will provide you with vitamins, minerals, and other health promoting nutrients. You should avoid added sugars, saturated fats, hydrogenated oils, and limit sodium intake (this isn't just table salt. .. turn the package over, read the label, stay under 2000 mg or 2g of total sodium daily). Track your calories, this will help you get an understanding of what a proper portion size is. Every day you should eat these:  -Veggies of all types  -Fruits  -Grains (strive for at least half of these to be whole-grain)  -Lean protein (poultry, fish, legumes, nuts)    Stick to the plate diet.    -44% of your dinner plate should be leafy green vegetables, dark green, red and orange vegetables.   Do

## 2023-04-28 NOTE — PATIENT INSTRUCTIONS
Eat between 1200 and 1800 Calories per day to loose weight  -Carbohydrates limit to 40 to 50g per meal (120g to 150g per day)  -Fats limit to 60g per day (total fat intake should be 30% to 35% of your total daily calories, so restrict to whichever number is lower)   -Of the fats you do eat, never eat trans fats, never eat unsaturated fats, limit your saturated fat intake to less than 20g per day (or 7% of your total daily calories, so restrict to whichever number is lower)  -Cholesterol limit to no more than 300mg per day (200mg per day if you have elevated triglycerides or total cholesterol)  -Sodium less than 2000mg per day    Otto Clave or similar application (or track by journal) to monitor calories and macronutrients. This is the most critical component to a heart healthy, balanced diet: honesty, keeping oneself accountable, ensure you are tracking daily goals and meeting them. Consider using service such as [employer]ip[employer] [never select keto, only select mediterranean or everything diets . .. enter the calorie goal above]    Food is medicine! Counseled the patient on importance of cardiovascular and resistance training. Make every attempt to engage in a level of activity, sustained for at least 30 minutes, where you saturate your undergarments with sweat. This should be done, at a minimum, five times per week. Target  to 150bpm sustained for 15 min straight 5 times per week with warm up (90bpm for 15 min) and cool down (7 to 10 minutes).  Clinic: [place] [address] [address] · In [address] [facility] · [phone]  Closes soon ? 5? PM  Website    [facility]: [address], [place] · [phone]  Closes soon ? 5? PM  Website    [facility]: [address] [place], [place] · In [facility] · [phone]  Closes soon ? 5? PM

## 2023-05-12 ENCOUNTER — PROCEDURE VISIT (OUTPATIENT)
Dept: PHYSICAL MEDICINE AND REHAB | Age: 38
End: 2023-05-12

## 2023-05-12 VITALS — BODY MASS INDEX: 49.44 KG/M2 | HEIGHT: 67 IN | WEIGHT: 315 LBS

## 2023-05-12 DIAGNOSIS — G56.22 CUBITAL TUNNEL SYNDROME ON LEFT: ICD-10-CM

## 2023-05-12 NOTE — PROGRESS NOTES
[facility]  Electrodiagnostic Laboratory  *Accredited by the [address] [place] [address] with exemplary status  [address] [facility] [address]  Phone: [phone]  Fax: [phone]    Referring Provider: Wendi White DO  Primary Care Physician: Shavonne Hare MD  Patient Name: Kindra Hernandez  Patient YOB: 1985  Gender: male  BMI: Body mass index is 67.35 kg/m². Height 5' 7\" (1.702 m), weight (!) 430 lb (195 kg). 5/12/2023    Reason for Referral: Cubital tunnel    Description of clinical problem:   Chief Complaint   Patient presents with    Extremity Pain     none    Numbness     Numbness/tingling in the pinky and ring finger on the left side. Since Jan. Hitting elbow will cause numbness/tingling to travel down the arm into the fingers. No acute injury. Extremity Weakness     none     Sensory NCS      Nerve / Sites Rec. Site Peak Lat PP Amp Segments Distance Velocity Temp. ms µV  cm m/s °C   L Median - Digit II (Antidromic)      Palm Dig II NR* NR Palm - Dig II 7 NR 35.1      Wrist Dig II NR* NR Wrist - Dig II 14 NR 35.1   L Ulnar - Digit V (Antidromic)      Wrist Dig V 4.01* 3.2* Wrist - Dig V 14 41 35.1   L Radial - Anatomical snuff box (Forearm)      Forearm Wrist 2.34 9.2* Forearm - Wrist 10 58 35.2   L Dorsal ulnar cutaneous - Hand dorsum (Forearm)      Forearm Hand dorsum NR* NR Forearm - Hand dorsum 8 NR 34.9       Motor NCS      Nerve / Sites Muscle Onset Amplitude Segments Distance Velocity Temp.     ms mV  cm m/s °C   L Median - APB      Palm APB 2.86 1.1* Palm - APB   35.1      Wrist APB 5.99* 1.1* Wrist - Palm 8 26* 35.1      Elbow APB 11.41 0.6* Elbow - Wrist 20 37 35.1   L Ulnar - ADM      Wrist ADM 2.86 4.5* Wrist - ADM 8  35.1      B. Elbow ADM 6.56 2.1* B. Elbow - Wrist 21 57 35.1      A. Elbow ADM 14.06 2.1* A.Elbow - B. Elbow 10 13* 35.1       F  Wave      Nerve Fmin % F    ms %   L Median - APB 37.71* 50   L Ulnar - ADM 38.54* 50       EMG

## 2023-05-12 NOTE — PATIENT INSTRUCTIONS
Electrodiagnotic Laboratory  Accredited by the [facility] with Exemplary status  ARNULFO Donaldson D.O. [facility]  [address] [facility] [address]  Phone: [phone]  Fax: [phone]        Today you had an electrodiagnostic exam which included nerve conduction studies (NCS) and electromyography (EMG). This test evaluated the electrical activity of your nerves and muscles to help determine if you have a nerve or muscle disease. This test can help determine the location and type of a nerve or muscle problem. This will help your referring doctor diagnose your condition and determine the appropriate next step in your treatment plan. After your test:    1. There are no long lasting side effects of the test.     2. You may resume your normal activities without restrictions. 3.  Resume any medications that were stopped for the test.     4  If you have sore areas or bruising in your muscles where the needle was placed, apply a cold pack to the sore area for 15-20 minutes three to four times a day as needed for pain. The soreness should go away in about 1-2 days. 5. Your results were provided  Briefly at the end of your test and the final detailed report will be provided to your referring physician, and/or primary care physician and any other parties you requested within 1-2 days of the examination. You may wish to contact your referring provider after a few days to determine what they would like you to do next. 6.  Please call [phone] with any questions or concerns and if you develop increased body temperature/fever, swelling, tenderness, increased pain and/or drainage from the sites where the needle was placed. Thank you for choosing us for your health care needs.

## 2023-05-12 NOTE — PROGRESS NOTES
Electrodiagnostic Laboratory  *Accredited by the [facility] with exemplary status  [address] [facility] [address]  Phone: [phone]  Fax: [phone]      Date of Examination: 05/12/23    Patient Name: Keely Fraser    An independent historian was not needed. Keely Fraser  is a 42y.o. year old male who was seen today regarding   Chief Complaint   Patient presents with    Extremity Pain     none    Numbness     Numbness/tingling in the pinky and ring finger on the left side. Since Jan. Hitting elbow will cause numbness/tingling to travel down the arm into the fingers. No acute injury. Extremity Weakness     none   History of left carpal tunnel release in 2013. The symptoms started after no injury. The symptoms are constant. I have reviewed the referring provider's office note. There is not a family history of neuromuscular conditions. Physical Exam: General: The patient is in no apparent distress. MSK: There is no joint effusion, deformity, instability, swelling, erythema or warmth. AROM is full in the spine and extremities. +tinel left wrist and elbow. Atrophy thenar. Neurologic:  No focal sensorimotor deficit. Reflexes 2+ and symmetric. Gait is normal.    Impression:     1. Cubital tunnel syndrome on left        Plan:   EMG is indicated to evaluate the above diagnosis. Orders Placed This Encounter   Procedures    KS NEEDLE EMG EA EXTREMTY W/PARASPINL AREA COMPLETE    KS NERVE CONDUCTION STUDIES 5-6 STUDIES     EMG was done today and showed multiple upper extremity entrapments. Multiple upper extremity entrapment neuropathies can be suggestive of an underlying peripheral neuropathy. If there is clinical concern for peripheral polyneuropathy, recommend EMG evaluation of the lower extremities to further evaluate. Clinically, the patient's symptoms are most consistent with the identified ulnar neuropathy at the elbow which is severe.

## 2023-05-15 DIAGNOSIS — R94.131 ABNORMAL EMG: ICD-10-CM

## 2023-05-15 DIAGNOSIS — G56.20 ULNAR NEUROPATHY, UNSPECIFIED LATERALITY: ICD-10-CM

## 2023-05-15 DIAGNOSIS — G56.22 CUBITAL TUNNEL SYNDROME ON LEFT: Primary | ICD-10-CM

## 2023-05-24 ENCOUNTER — PREP FOR PROCEDURE (OUTPATIENT)
Dept: ORTHOPEDIC SURGERY | Age: 38
End: 2023-05-24

## 2023-05-24 ENCOUNTER — OFFICE VISIT (OUTPATIENT)
Dept: ORTHOPEDIC SURGERY | Age: 38
End: 2023-05-24
Payer: COMMERCIAL

## 2023-05-24 ENCOUNTER — TELEPHONE (OUTPATIENT)
Dept: ORTHOPEDIC SURGERY | Age: 38
End: 2023-05-24

## 2023-05-24 VITALS — TEMPERATURE: 98 F | WEIGHT: 315 LBS | BODY MASS INDEX: 49.44 KG/M2 | HEIGHT: 67 IN

## 2023-05-24 DIAGNOSIS — G56.02 CARPAL TUNNEL SYNDROME ON LEFT: ICD-10-CM

## 2023-05-24 DIAGNOSIS — G56.22 CUBITAL TUNNEL SYNDROME, LEFT: Primary | ICD-10-CM

## 2023-05-24 DIAGNOSIS — G56.32 RADIAL NEUROPATHY, LEFT: ICD-10-CM

## 2023-05-24 PROCEDURE — 99214 OFFICE O/P EST MOD 30 MIN: CPT | Performed by: ORTHOPAEDIC SURGERY

## 2023-05-24 NOTE — TELEPHONE ENCOUNTER
Prior Authorization Form:      DEMOGRAPHICS:                     Patient Name:  Aurelia Sal  Patient :  1985            Insurance:  Payor: Sheldon Boy / Plan: Sheldon Boy - [place] PPO / Product Type: *No Product type* /   Insurance ID Number:    Payer/Plan Subscr  Sex Relation Sub. Ins. ID Effective Group Num   1.  [address] 1985 Male Self [id number]                                   [address]         DIAGNOSIS & PROCEDURE:                       Procedure/Operation: ULNA NERVE DECOMPRESSION LEFT ELBOW           CPT Code: 74475    Diagnosis:  CUBITAL TUNNEL SYNDROME - LEFT    ICD10 Code: G56.22    Location:  [address] [place] [address]      Surgeon:  Nisha Ibarra D.O.    SCHEDULING INFORMATION:                          Date: 2023    Time: TBA               Anesthesia:  General                                                       Status:  Outpatient        Special Comments:  NONE       Electronically signed by Sheree Funes on 2023 at 4:08 PM

## 2023-05-24 NOTE — PROGRESS NOTES
Irasema Lopez is a 42 y.o. male, who presents   Chief Complaint   Patient presents with    Hand Pain     New patient left cubital tunnel syndrome since January. No hx of injury. EMG done by Dr. Bruno Meek. Patient states continous numbness and tingling in the left ring and pinky fingers. HPI[de-identified] Has had problems with his left upper extremity since January with nonstop numbness and tingling. He does a lot of varied work which includes a lot of heavier hand work as well. Is more discomfort and symptoms on the ulnar side of his hand. Does not wake him and he does not complain of dropping objects. He is also right-hand dominant. Treatments been with some exercises and bracing of the elbow. He has had carpal tunnel surgery done on both sides by another physician in the past with he describes as pretty good results. Is not really complaining of symptoms of numbness tingling or weakness in the more radial part of his hand innervated by the median nerve. Allergies; medications; past medical, surgical, family, and social history; and problem list have been reviewed today and updated as indicated in this encounter - see below following Ortho specifics. Musculoskeletal: Skin condition circulation is good in left upper extremity. Elbow wrist hand motion are all good. He has a healed scar in the palm of the left hand which would be consistent with carpal tunnel decompression. He has a lot of calluses on his hands and his skin is dry which obviates some of the exam.  He has decreased pinprick perception in the ulnar side of the hand. He has pretty good muscle mass and strength still. Percussion of the ulnar nerve at the cubital tunnel area does cause paresthesia and a withdrawal reaction.     Electrodiagnostic studies: Sting done by Dr. Bruno Meek 6 5/12/2013 shows a multitude of abnormalities including ulnar nerve compression was suggested at the elbow, median nerve compression at the wrist despite his previous

## 2023-05-25 ENCOUNTER — TELEPHONE (OUTPATIENT)
Dept: FAMILY MEDICINE CLINIC | Age: 38
End: 2023-05-25

## 2023-05-25 ENCOUNTER — OFFICE VISIT (OUTPATIENT)
Dept: FAMILY MEDICINE CLINIC | Age: 38
End: 2023-05-25

## 2023-05-25 VITALS
WEIGHT: 315 LBS | HEIGHT: 67 IN | BODY MASS INDEX: 49.44 KG/M2 | SYSTOLIC BLOOD PRESSURE: 138 MMHG | HEART RATE: 88 BPM | OXYGEN SATURATION: 96 % | TEMPERATURE: 97.4 F | DIASTOLIC BLOOD PRESSURE: 88 MMHG

## 2023-05-25 DIAGNOSIS — I10 PRIMARY HYPERTENSION: ICD-10-CM

## 2023-05-25 DIAGNOSIS — E78.5 HYPERLIPIDEMIA, UNSPECIFIED HYPERLIPIDEMIA TYPE: ICD-10-CM

## 2023-05-25 DIAGNOSIS — G47.33 OSA (OBSTRUCTIVE SLEEP APNEA): ICD-10-CM

## 2023-05-25 DIAGNOSIS — Z01.818 PRE-OP EXAMINATION: Primary | ICD-10-CM

## 2023-05-25 DIAGNOSIS — G56.22 CUBITAL TUNNEL SYNDROME ON LEFT: ICD-10-CM

## 2023-05-25 LAB
ALBUMIN SERPL-MCNC: 4 G/DL (ref 3.5–5.2)
ALP SERPL-CCNC: 67 U/L (ref 40–129)
ALT SERPL-CCNC: 29 U/L (ref 0–40)
ANION GAP SERPL CALCULATED.3IONS-SCNC: 9 MMOL/L (ref 7–16)
AST SERPL-CCNC: 28 U/L (ref 0–39)
BASOPHILS # BLD: 0.06 E9/L (ref 0–0.2)
BASOPHILS NFR BLD: 0.9 % (ref 0–2)
BILIRUB SERPL-MCNC: 0.6 MG/DL (ref 0–1.2)
BUN SERPL-MCNC: 12 MG/DL (ref 6–20)
CALCIUM SERPL-MCNC: 9.5 MG/DL (ref 8.6–10.2)
CHLORIDE SERPL-SCNC: 104 MMOL/L (ref 98–107)
CHOLESTEROL, FASTING: 215 MG/DL (ref 0–199)
CO2 SERPL-SCNC: 26 MMOL/L (ref 22–29)
CREAT SERPL-MCNC: 0.7 MG/DL (ref 0.7–1.2)
EOSINOPHIL # BLD: 0.24 E9/L (ref 0.05–0.5)
EOSINOPHIL NFR BLD: 3.4 % (ref 0–6)
ERYTHROCYTE [DISTWIDTH] IN BLOOD BY AUTOMATED COUNT: 13.5 FL (ref 11.5–15)
GLUCOSE SERPL-MCNC: 105 MG/DL (ref 74–99)
HCT VFR BLD AUTO: 46.1 % (ref 37–54)
HDLC SERPL-MCNC: 53 MG/DL
HGB BLD-MCNC: 14.4 G/DL (ref 12.5–16.5)
IMM GRANULOCYTES # BLD: 0.02 E9/L
IMM GRANULOCYTES NFR BLD: 0.3 % (ref 0–5)
LDL CHOLESTEROL CALCULATED: 139 MG/DL (ref 0–99)
LYMPHOCYTES # BLD: 2.06 E9/L (ref 1.5–4)
LYMPHOCYTES NFR BLD: 29.3 % (ref 20–42)
MCH RBC QN AUTO: 26.7 PG (ref 26–35)
MCHC RBC AUTO-ENTMCNC: 31.2 % (ref 32–34.5)
MCV RBC AUTO: 85.4 FL (ref 80–99.9)
MONOCYTES # BLD: 0.47 E9/L (ref 0.1–0.95)
MONOCYTES NFR BLD: 6.7 % (ref 2–12)
NEUTROPHILS # BLD: 4.18 E9/L (ref 1.8–7.3)
NEUTS SEG NFR BLD: 59.4 % (ref 43–80)
PLATELET # BLD AUTO: 336 E9/L (ref 130–450)
PMV BLD AUTO: 10.2 FL (ref 7–12)
POTASSIUM SERPL-SCNC: 4.5 MMOL/L (ref 3.5–5)
PROT SERPL-MCNC: 7.5 G/DL (ref 6.4–8.3)
RBC # BLD AUTO: 5.4 E12/L (ref 3.8–5.8)
SODIUM SERPL-SCNC: 139 MMOL/L (ref 132–146)
T4 FREE SERPL-MCNC: 1.26 NG/DL (ref 0.93–1.7)
TRIGLYCERIDE, FASTING: 115 MG/DL (ref 0–149)
TSH SERPL-MCNC: 2.06 UIU/ML (ref 0.27–4.2)
VLDLC SERPL CALC-MCNC: 23 MG/DL
WBC # BLD: 7 E9/L (ref 4.5–11.5)

## 2023-05-25 NOTE — TELEPHONE ENCOUNTER
----- Message from Glen Showers sent at 5/25/2023  9:26 AM EDT -----  Subject: Appointment Request    Reason for Call: Established Patient Appointment needed: Routine Pre-Op    QUESTIONS    Reason for appointment request? Available appointments did not meet   patient need     Additional Information for Provider? wife nia need a pre op apt for   her  for left arm procedure patient is having. Dr Armin Carrion   performing procedures not sure if ekg.  Apt is lacey 1st date.   ---------------------------------------------------------------------------  --------------  Quentin Stage WDYY  [phone]; OK to leave message on voicemail  ---------------------------------------------------------------------------  --------------  SCRIPT ANSWERS  COVID Screen: Heidi Banks

## 2023-05-25 NOTE — PROGRESS NOTES
complications: Hypertension, Obstructive sleep apnea  His MADDIE is currently untreated and confers him an above average risk for adverse event for this planned procedure but should not preclude him from having this elective procedure performed. His HTN is typically well controlled. His initial elevation today is related to the fact he does not take his BP medications first thing in the morning and instead waits until afternoon. He was advised to optimize this timing for mornings and to continue to work on diet and weight loss. He does not smoke but he chews tobacco.  This is still an independent risk factor in healing wounds. He was advised to continue his efforts to quit. Current medications which may produce withdrawal symptoms if withheld perioperatively: none      Plan:     1. Preoperative workup as follows: ECG, hemoglobin, hematocrit, electrolytes, creatinine, glucose, liver function studies  2. Change in medication regimen before surgery: None  3. Prophylaxis for cardiac events with perioperative beta-blockers: Not indicated  ACC/AHA indications for pre-operative beta-blocker use:    Vascular surgery with history of postitive stress test  Intermediate or high risk surgery with history of CAD   Intermediate or high risk surgery with multiple clinical predictors of CAD- 2 of the following: history of compensated or prior heart failure, history of cerebrovascular disease, DM, or renal insufficiency    Routine administration of higher-dose, long-acting metoprolol in beta-blocker-naïve patients on the day of surgery, and in the absence of dose titration is associated with an overall increase in mortality. Beta-blockers should be started days to weeks prior to surgery and titrated to pulse < 70.  4. Deep vein thrombosis prophylaxis: regimen to be chosen by surgical team  5. No contraindications to planned surgery pending review of update preoperative labs.

## 2023-05-25 NOTE — TELEPHONE ENCOUNTER
I returned patient's wife's call, no answer and no VM. I called patient to ask what his availability is to schedule his Pre Op since he is unable to make an appt today at 10:00 am as he had been offered by staff. Patient stated that his wife informed staff she didn't think he could make it due to patient being at work, but he spoke w/his employer and would like to make the appt, but may be 5-10 minutes late. I informed patient that there is a window of 15 minutes from the appt time in which he can be late. Patient was agreeable and stated he's on his way.     Last seen 4/28/2023  Next appt 5/25/2023

## 2023-05-31 ENCOUNTER — TELEPHONE (OUTPATIENT)
Dept: FAMILY MEDICINE CLINIC | Age: 38
End: 2023-05-31

## 2023-05-31 ENCOUNTER — ANESTHESIA EVENT (OUTPATIENT)
Dept: OPERATING ROOM | Age: 38
End: 2023-05-31
Payer: COMMERCIAL

## 2023-05-31 NOTE — PROGRESS NOTES
[address] [facility]                                                                                                                    PRE OP INSTRUCTIONS FOR  Haley Ring        Date: 5/31/2023    Date of surgery: 6/1/23   Arrival Time: Hospital will call you between 5pm and 7pm with your final arrival time for surgery    Nothing by mouth (NPO) as instructed. ___nothing to eat or drink after midnight_________________________________________________________________    Take the following medications with a small sip of water on the morning of Surgery: none tylenol prn     Diabetics may take evening dose of insulin but none after midnight. If you feel symptomatic or low blood sugar morning of surgery drink 1-2 ounces of apple juice only. Aspirin, Ibuprofen, Advil, Naproxen, Vitamin E and other Anti-inflammatory products should be stopped  before surgery  as directed by your physician. Take Tylenol only unless instructed otherwise by your surgeon. Check with your Doctor regarding stopping Plavix, Coumadin, Lovenox, Eliquis, Effient, or other blood thinners. Do not smoke,use illicit drugs and do not drink any alcoholic beverages 24 hours prior to surgery. You may brush your teeth the morning of surgery. DO NOT SWALLOW WATER    You MUST make arrangements for a responsible adult to take you home after your surgery. You will not be allowed to leave alone or drive yourself home. It is strongly suggested someone stay with you the first 24 hrs. Your surgery will be cancelled if you do not have a ride home. PEDIATRIC PATIENTS ONLY:  A parent/legal guardian must accompany a child scheduled for surgery and plan to stay at the hospital until the child is discharged. Please do not bring other children with you.     Please wear simple, loose fitting clothing to the hospital.  Petra Garcia not bring valuables (money, credit cards, checkbooks, etc.) Do not wear any makeup (including no eye makeup) or

## 2023-05-31 NOTE — TELEPHONE ENCOUNTER
Osorio Pay from [address] [facility] pre op called. She stated she got the pre op visit from 5.25.23. It stated pending labs. She is asking if labs have been reviewed and asking if pt is clear for surgery? He is scheduled for tomorrow 6.1.23.  please advise.   Last seen 5/25/2023  Next appt 6/21/2023

## 2023-05-31 NOTE — PROGRESS NOTES
Call to [facility] at Dr. Marley Sep office for him to review labs and clear. She will message him.

## 2023-05-31 NOTE — TELEPHONE ENCOUNTER
Per dr. [place]     Medically cleared / appropriate risk for planned procedure.          Cooper Martinez from pre op was informed

## 2023-06-01 ENCOUNTER — ANESTHESIA (OUTPATIENT)
Dept: OPERATING ROOM | Age: 38
End: 2023-06-01
Payer: COMMERCIAL

## 2023-06-01 ENCOUNTER — HOSPITAL ENCOUNTER (OUTPATIENT)
Age: 38
Setting detail: OUTPATIENT SURGERY
Discharge: HOME | End: 2023-06-01
Attending: ORTHOPAEDIC SURGERY | Admitting: ORTHOPAEDIC SURGERY
Payer: COMMERCIAL

## 2023-06-01 VITALS
SYSTOLIC BLOOD PRESSURE: 131 MMHG | DIASTOLIC BLOOD PRESSURE: 71 MMHG | OXYGEN SATURATION: 93 % | HEIGHT: 67 IN | TEMPERATURE: 97.1 F | HEART RATE: 77 BPM | BODY MASS INDEX: 49.44 KG/M2 | WEIGHT: 315 LBS | RESPIRATION RATE: 16 BRPM

## 2023-06-01 DIAGNOSIS — G56.22 CUBITAL TUNNEL SYNDROME ON LEFT: ICD-10-CM

## 2023-06-01 PROCEDURE — 64718 REVISE ULNAR NERVE AT ELBOW: CPT | Performed by: ORTHOPAEDIC SURGERY

## 2023-06-01 PROCEDURE — 3600000012 HC SURGERY LEVEL 2 ADDTL 15MIN: Performed by: ORTHOPAEDIC SURGERY

## 2023-06-01 PROCEDURE — 2500000003 HC RX 250 WO HCPCS: Performed by: NURSE ANESTHETIST, CERTIFIED REGISTERED

## 2023-06-01 PROCEDURE — 2580000003 HC RX 258: Performed by: ANESTHESIOLOGY

## 2023-06-01 PROCEDURE — 6360000002 HC RX W HCPCS: Performed by: NURSE ANESTHETIST, CERTIFIED REGISTERED

## 2023-06-01 PROCEDURE — 3600000002 HC SURGERY LEVEL 2 BASE: Performed by: ORTHOPAEDIC SURGERY

## 2023-06-01 PROCEDURE — 6370000000 HC RX 637 (ALT 250 FOR IP): Performed by: ANESTHESIOLOGY

## 2023-06-01 PROCEDURE — 3700000001 HC ADD 15 MINUTES (ANESTHESIA): Performed by: ORTHOPAEDIC SURGERY

## 2023-06-01 PROCEDURE — 7100000011 HC PHASE II RECOVERY - ADDTL 15 MIN: Performed by: ORTHOPAEDIC SURGERY

## 2023-06-01 PROCEDURE — 6360000002 HC RX W HCPCS

## 2023-06-01 PROCEDURE — 7100000001 HC PACU RECOVERY - ADDTL 15 MIN: Performed by: ORTHOPAEDIC SURGERY

## 2023-06-01 PROCEDURE — 7100000010 HC PHASE II RECOVERY - FIRST 15 MIN: Performed by: ORTHOPAEDIC SURGERY

## 2023-06-01 PROCEDURE — 7100000000 HC PACU RECOVERY - FIRST 15 MIN: Performed by: ORTHOPAEDIC SURGERY

## 2023-06-01 PROCEDURE — 94660 CPAP INITIATION&MGMT: CPT

## 2023-06-01 PROCEDURE — 6370000000 HC RX 637 (ALT 250 FOR IP)

## 2023-06-01 PROCEDURE — 3700000000 HC ANESTHESIA ATTENDED CARE: Performed by: ORTHOPAEDIC SURGERY

## 2023-06-01 PROCEDURE — 2709999900 HC NON-CHARGEABLE SUPPLY: Performed by: ORTHOPAEDIC SURGERY

## 2023-06-01 RX ORDER — SODIUM CHLORIDE 9 MG/ML
INJECTION, SOLUTION INTRAVENOUS PRN
Status: DISCONTINUED | OUTPATIENT
Start: 2023-06-01 | End: 2023-06-01 | Stop reason: HOSPADM

## 2023-06-01 RX ORDER — OXYCODONE HYDROCHLORIDE AND ACETAMINOPHEN 5; 325 MG/1; MG/1
1 TABLET ORAL ONCE
Status: CANCELLED | OUTPATIENT
Start: 2023-06-01

## 2023-06-01 RX ORDER — PROCHLORPERAZINE EDISYLATE 5 MG/ML
5 INJECTION INTRAMUSCULAR; INTRAVENOUS
Status: DISCONTINUED | OUTPATIENT
Start: 2023-06-01 | End: 2023-06-01 | Stop reason: HOSPADM

## 2023-06-01 RX ORDER — ROCURONIUM BROMIDE 10 MG/ML
INJECTION, SOLUTION INTRAVENOUS PRN
Status: DISCONTINUED | OUTPATIENT
Start: 2023-06-01 | End: 2023-06-01 | Stop reason: SDUPTHER

## 2023-06-01 RX ORDER — LABETALOL HYDROCHLORIDE 5 MG/ML
10 INJECTION, SOLUTION INTRAVENOUS
Status: DISCONTINUED | OUTPATIENT
Start: 2023-06-01 | End: 2023-06-01 | Stop reason: HOSPADM

## 2023-06-01 RX ORDER — FENTANYL CITRATE 50 UG/ML
INJECTION, SOLUTION INTRAMUSCULAR; INTRAVENOUS PRN
Status: DISCONTINUED | OUTPATIENT
Start: 2023-06-01 | End: 2023-06-01 | Stop reason: SDUPTHER

## 2023-06-01 RX ORDER — HYDROMORPHONE HYDROCHLORIDE 1 MG/ML
0.25 INJECTION, SOLUTION INTRAMUSCULAR; INTRAVENOUS; SUBCUTANEOUS EVERY 5 MIN PRN
Status: DISCONTINUED | OUTPATIENT
Start: 2023-06-01 | End: 2023-06-01 | Stop reason: HOSPADM

## 2023-06-01 RX ORDER — SODIUM CHLORIDE, SODIUM LACTATE, POTASSIUM CHLORIDE, CALCIUM CHLORIDE 600; 310; 30; 20 MG/100ML; MG/100ML; MG/100ML; MG/100ML
INJECTION, SOLUTION INTRAVENOUS CONTINUOUS
Status: DISCONTINUED | OUTPATIENT
Start: 2023-06-01 | End: 2023-06-01 | Stop reason: HOSPADM

## 2023-06-01 RX ORDER — HYDROMORPHONE HYDROCHLORIDE 1 MG/ML
0.5 INJECTION, SOLUTION INTRAMUSCULAR; INTRAVENOUS; SUBCUTANEOUS EVERY 5 MIN PRN
Status: DISCONTINUED | OUTPATIENT
Start: 2023-06-01 | End: 2023-06-01 | Stop reason: HOSPADM

## 2023-06-01 RX ORDER — PROPOFOL 10 MG/ML
INJECTION, EMULSION INTRAVENOUS PRN
Status: DISCONTINUED | OUTPATIENT
Start: 2023-06-01 | End: 2023-06-01 | Stop reason: SDUPTHER

## 2023-06-01 RX ORDER — LIDOCAINE HYDROCHLORIDE 20 MG/ML
INJECTION, SOLUTION INTRAVENOUS PRN
Status: DISCONTINUED | OUTPATIENT
Start: 2023-06-01 | End: 2023-06-01 | Stop reason: SDUPTHER

## 2023-06-01 RX ORDER — DIPHENHYDRAMINE HYDROCHLORIDE 50 MG/ML
12.5 INJECTION INTRAMUSCULAR; INTRAVENOUS
Status: DISCONTINUED | OUTPATIENT
Start: 2023-06-01 | End: 2023-06-01 | Stop reason: HOSPADM

## 2023-06-01 RX ORDER — IPRATROPIUM BROMIDE AND ALBUTEROL SULFATE 2.5; .5 MG/3ML; MG/3ML
SOLUTION RESPIRATORY (INHALATION)
Status: COMPLETED
Start: 2023-06-01 | End: 2023-06-01

## 2023-06-01 RX ORDER — SUCCINYLCHOLINE/SOD CL,ISO/PF 200MG/10ML
SYRINGE (ML) INTRAVENOUS PRN
Status: DISCONTINUED | OUTPATIENT
Start: 2023-06-01 | End: 2023-06-01 | Stop reason: SDUPTHER

## 2023-06-01 RX ORDER — ONDANSETRON 2 MG/ML
INJECTION INTRAMUSCULAR; INTRAVENOUS PRN
Status: DISCONTINUED | OUTPATIENT
Start: 2023-06-01 | End: 2023-06-01 | Stop reason: SDUPTHER

## 2023-06-01 RX ORDER — DEXAMETHASONE SODIUM PHOSPHATE 10 MG/ML
INJECTION, SOLUTION INTRAMUSCULAR; INTRAVENOUS PRN
Status: DISCONTINUED | OUTPATIENT
Start: 2023-06-01 | End: 2023-06-01 | Stop reason: SDUPTHER

## 2023-06-01 RX ORDER — SODIUM CHLORIDE 0.9 % (FLUSH) 0.9 %
5-40 SYRINGE (ML) INJECTION EVERY 12 HOURS SCHEDULED
Status: DISCONTINUED | OUTPATIENT
Start: 2023-06-01 | End: 2023-06-01 | Stop reason: HOSPADM

## 2023-06-01 RX ORDER — HYDRALAZINE HYDROCHLORIDE 20 MG/ML
10 INJECTION INTRAMUSCULAR; INTRAVENOUS
Status: DISCONTINUED | OUTPATIENT
Start: 2023-06-01 | End: 2023-06-01 | Stop reason: HOSPADM

## 2023-06-01 RX ORDER — KETOROLAC TROMETHAMINE 30 MG/ML
INJECTION, SOLUTION INTRAMUSCULAR; INTRAVENOUS
Status: COMPLETED
Start: 2023-06-01 | End: 2023-06-01

## 2023-06-01 RX ORDER — SODIUM CHLORIDE 0.9 % (FLUSH) 0.9 %
5-40 SYRINGE (ML) INJECTION PRN
Status: DISCONTINUED | OUTPATIENT
Start: 2023-06-01 | End: 2023-06-01 | Stop reason: HOSPADM

## 2023-06-01 RX ORDER — MEPERIDINE HYDROCHLORIDE 25 MG/ML
12.5 INJECTION INTRAMUSCULAR; INTRAVENOUS; SUBCUTANEOUS EVERY 5 MIN PRN
Status: DISCONTINUED | OUTPATIENT
Start: 2023-06-01 | End: 2023-06-01 | Stop reason: HOSPADM

## 2023-06-01 RX ORDER — KETOROLAC TROMETHAMINE 30 MG/ML
30 INJECTION, SOLUTION INTRAMUSCULAR; INTRAVENOUS ONCE
Status: COMPLETED | OUTPATIENT
Start: 2023-06-01 | End: 2023-06-01

## 2023-06-01 RX ORDER — IPRATROPIUM BROMIDE AND ALBUTEROL SULFATE 2.5; .5 MG/3ML; MG/3ML
1 SOLUTION RESPIRATORY (INHALATION)
Status: COMPLETED | OUTPATIENT
Start: 2023-06-01 | End: 2023-06-01

## 2023-06-01 RX ORDER — MIDAZOLAM HYDROCHLORIDE 1 MG/ML
INJECTION INTRAMUSCULAR; INTRAVENOUS PRN
Status: DISCONTINUED | OUTPATIENT
Start: 2023-06-01 | End: 2023-06-01 | Stop reason: SDUPTHER

## 2023-06-01 RX ORDER — OXYCODONE HYDROCHLORIDE AND ACETAMINOPHEN 5; 325 MG/1; MG/1
1 TABLET ORAL
Status: COMPLETED | OUTPATIENT
Start: 2023-06-01 | End: 2023-06-01

## 2023-06-01 RX ORDER — DEXMEDETOMIDINE HYDROCHLORIDE 100 UG/ML
INJECTION, SOLUTION INTRAVENOUS PRN
Status: DISCONTINUED | OUTPATIENT
Start: 2023-06-01 | End: 2023-06-01 | Stop reason: SDUPTHER

## 2023-06-01 RX ORDER — HALOPERIDOL 5 MG/ML
1 INJECTION INTRAMUSCULAR
Status: DISCONTINUED | OUTPATIENT
Start: 2023-06-01 | End: 2023-06-01 | Stop reason: HOSPADM

## 2023-06-01 RX ADMIN — KETOROLAC TROMETHAMINE 30 MG: 30 INJECTION, SOLUTION INTRAMUSCULAR; INTRAVENOUS at 14:07

## 2023-06-01 RX ADMIN — ROCURONIUM BROMIDE 40 MG: 10 SOLUTION INTRAVENOUS at 12:26

## 2023-06-01 RX ADMIN — PROPOFOL 300 MG: 10 INJECTION, EMULSION INTRAVENOUS at 12:14

## 2023-06-01 RX ADMIN — MIDAZOLAM 2 MG: 1 INJECTION INTRAMUSCULAR; INTRAVENOUS at 12:05

## 2023-06-01 RX ADMIN — IPRATROPIUM BROMIDE AND ALBUTEROL SULFATE 1 DOSE: 2.5; .5 SOLUTION RESPIRATORY (INHALATION) at 14:37

## 2023-06-01 RX ADMIN — SUGAMMADEX 500 MG: 100 INJECTION, SOLUTION INTRAVENOUS at 13:24

## 2023-06-01 RX ADMIN — SODIUM CHLORIDE, POTASSIUM CHLORIDE, SODIUM LACTATE AND CALCIUM CHLORIDE: 600; 310; 30; 20 INJECTION, SOLUTION INTRAVENOUS at 13:00

## 2023-06-01 RX ADMIN — HYDROMORPHONE HYDROCHLORIDE 0.25 MG: 1 INJECTION, SOLUTION INTRAMUSCULAR; INTRAVENOUS; SUBCUTANEOUS at 14:21

## 2023-06-01 RX ADMIN — SODIUM CHLORIDE, POTASSIUM CHLORIDE, SODIUM LACTATE AND CALCIUM CHLORIDE: 600; 310; 30; 20 INJECTION, SOLUTION INTRAVENOUS at 11:10

## 2023-06-01 RX ADMIN — LIDOCAINE HYDROCHLORIDE 100 MG: 20 INJECTION, SOLUTION INTRAVENOUS at 12:14

## 2023-06-01 RX ADMIN — FENTANYL CITRATE 50 MCG: 50 INJECTION, SOLUTION INTRAMUSCULAR; INTRAVENOUS at 13:05

## 2023-06-01 RX ADMIN — FENTANYL CITRATE 50 MCG: 50 INJECTION, SOLUTION INTRAMUSCULAR; INTRAVENOUS at 12:15

## 2023-06-01 RX ADMIN — ONDANSETRON 4 MG: 2 INJECTION INTRAMUSCULAR; INTRAVENOUS at 13:07

## 2023-06-01 RX ADMIN — FENTANYL CITRATE 100 MCG: 50 INJECTION, SOLUTION INTRAMUSCULAR; INTRAVENOUS at 12:30

## 2023-06-01 RX ADMIN — Medication 200 MG: at 12:14

## 2023-06-01 RX ADMIN — DEXMEDETOMIDINE HYDROCHLORIDE 50 MCG: 100 INJECTION, SOLUTION INTRAVENOUS at 12:24

## 2023-06-01 RX ADMIN — DEXAMETHASONE SODIUM PHOSPHATE 10 MG: 10 INJECTION, SOLUTION INTRAMUSCULAR; INTRAVENOUS at 13:07

## 2023-06-01 RX ADMIN — ROCURONIUM BROMIDE 10 MG: 10 SOLUTION INTRAVENOUS at 12:14

## 2023-06-01 RX ADMIN — FENTANYL CITRATE 50 MCG: 50 INJECTION, SOLUTION INTRAMUSCULAR; INTRAVENOUS at 13:25

## 2023-06-01 RX ADMIN — OXYCODONE HYDROCHLORIDE AND ACETAMINOPHEN 1 TABLET: 5; 325 TABLET ORAL at 15:35

## 2023-06-01 ASSESSMENT — PAIN DESCRIPTION - DESCRIPTORS
DESCRIPTORS: ACHING;DISCOMFORT
DESCRIPTORS: ACHING;DISCOMFORT

## 2023-06-01 ASSESSMENT — PAIN SCALES - GENERAL
PAINLEVEL_OUTOF10: 7
PAINLEVEL_OUTOF10: 8
PAINLEVEL_OUTOF10: 8
PAINLEVEL_OUTOF10: 7

## 2023-06-01 ASSESSMENT — PAIN DESCRIPTION - ORIENTATION
ORIENTATION: MID;LEFT
ORIENTATION: MID
ORIENTATION: LEFT

## 2023-06-01 ASSESSMENT — PAIN DESCRIPTION - PAIN TYPE
TYPE: ACUTE PAIN;SURGICAL PAIN
TYPE: ACUTE PAIN;SURGICAL PAIN

## 2023-06-01 ASSESSMENT — PAIN DESCRIPTION - LOCATION
LOCATION: ELBOW

## 2023-06-01 ASSESSMENT — PAIN DESCRIPTION - FREQUENCY
FREQUENCY: CONTINUOUS
FREQUENCY: CONTINUOUS

## 2023-06-01 ASSESSMENT — PAIN DESCRIPTION - ONSET
ONSET: ON-GOING
ONSET: PROGRESSIVE

## 2023-06-01 ASSESSMENT — PAIN - FUNCTIONAL ASSESSMENT: PAIN_FUNCTIONAL_ASSESSMENT: NONE - DENIES PAIN

## 2023-06-01 NOTE — PROGRESS NOTES
Respiratory therapy at the bedside shortly after the patient's arrival to the unit. Bipap placed.  Settings are 12/6, FIO2 100%

## 2023-06-01 NOTE — ANESTHESIA POSTPROCEDURE EVALUATION
Department of Anesthesiology  Postprocedure Note    Patient: Zakia Garcia  MRN: [id number]  YOB: 1985  Date of evaluation: 6/1/2023      Procedure Summary     Date: 06/01/23 Room / Location: [address] [place] [address] 03 / [address] [facility]    Anesthesia Start: 1200 Anesthesia Stop: 6160    Procedure: ULNAR NERVE DECOMPRESSION LEFT ELBOW (Left: Elbow) Diagnosis:       Cubital tunnel syndrome on left      (Cubital tunnel syndrome on left [G56.22])    Surgeons: Laron Bailon DO Responsible Provider: Johnny Lazo MD    Anesthesia Type: general ASA Status: 3          Anesthesia Type: No value filed.     West Phase I: West Score: 10    West Phase II: West Score: 10      Anesthesia Post Evaluation    Patient location during evaluation: PACU  Patient participation: complete - patient participated  Level of consciousness: awake  Pain score: 3  Airway patency: patent  Nausea & Vomiting: no nausea and no vomiting  Complications: no  Cardiovascular status: hemodynamically stable  Respiratory status: acceptable  Hydration status: euvolemic

## 2023-06-01 NOTE — PROGRESS NOTES
Dr. Eleno Moseley notified of the patient's oxygen sat, pain level, and pain medication given. Order placed for a percocet. Okay to discharge to [facility].

## 2023-06-01 NOTE — OP NOTE
Operative report        DATE OF PROCEDURE: 6/1/2023     SURGEON: Mason Li    ASSISTANT: Missy Kaplan    PREOPERATIVE DIAGNOSIS: Cubital tunnel syndrome left    POSTOPERATIVE DIAGNOSIS: Same    OPERATION: Ulnar nerve decompression left elbow    ANESTHESIA: General    ESTIMATED BLOOD LOSS: Scant    COMPLICATIONS: None    SPECIMENS: Was sent to pathology    HISTORY: The patient is a 42y.o. year old male with history of above preop diagnosis. I explained the risk, benefits, expected outcome, and alternatives to the procedure. Patient understands and is in agreement. PROCEDURE: The patient is brought the operating room after site side were notified. Adequate general anesthesia was administered by anesthesia department the patient supine on the operating table. A tourniquet was placed high on the left upper arm the arm was prepped and draped sterile fashion. Incision was marked for ulnar nerve decompression at the left elbow area. The arm was then exsanguinated with elastic wrap tourniquet pressure set at 250 mmHg pressure. The incision was made and deepened down through subcutaneous tissue. Electrocautery was used for throughout the procedure and 2 and half power loupe magnification was used as well. Dissection carried down to the sheath around the ulnar nerve more proximal to the medial epicondyle which made it easier to identify. This epineurium was incised to release this and then dissection was carried out in a careful layered fashion distally to expose the nerve all the way down to the flexor muscle origins. The muscle sheath was opened to remove any pressure from this. Limited dissection was carried out in the muscle to prevent damage to any nerves and connections to muscle. The dissection was then carried out further proximal and adjacent to the intermuscular septum and finger dilation was done to open up the area here.   The nerve was partially mobilized in its bed and was not felt to

## 2023-06-01 NOTE — ANESTHESIA PRE PROCEDURE
I10    Skin tags, multiple acquired L91.8    MADDIE (obstructive sleep apnea) G47.33    Rupture of distal biceps tendon, right, initial encounter S46.211A    Body mass index (BMI) 60.0-69.9, adult ([facility]) Z68.44    Cubital tunnel syndrome on left G56.22       Past Medical History:        Diagnosis Date    Hyperlipidemia     Hypertension        Past Surgical History:        Procedure Laterality Date    ARM SURGERY Right 2020    DISTAL BICEPS TENDON RUPTURE REPAIR-RIGHT performed by Otto Mcknight DO at [address]      bilateral    FRACTURE SURGERY      left knee       Social History:    Social History     Tobacco Use    Smoking status: Former     Packs/day: 0.50     Years: 10.00     Pack years: 5.00     Types: Cigarettes     Quit date: 2010     Years since quittin.4    Smokeless tobacco: Current     Types: Chew    Tobacco comments:     patient quit   Substance Use Topics    Alcohol use: Yes     Comment: occasionally                                Ready to quit: Not Answered  Counseling given: Not Answered  Tobacco comments: patient quit      Vital Signs (Current): There were no vitals filed for this visit.                                            BP Readings from Last 3 Encounters:   23 138/88   23 134/78   23 (!) 140/90       NPO Status:                                                                                 BMI:   Wt Readings from Last 3 Encounters:   23 (!) 423 lb (191.9 kg)   23 (!) 430 lb (195 kg)   23 (!) 430 lb (195 kg)     There is no height or weight on file to calculate BMI.    CBC:   Lab Results   Component Value Date/Time    WBC 7.0 2023 11:00 AM    RBC 5.40 2023 11:00 AM    HGB 14.4 2023 11:00 AM    HCT 46.1 2023 11:00 AM    MCV 85.4 2023 11:00 AM    RDW 13.5 2023 11:00 AM     2023 11:00 AM       CMP:   Lab Results   Component Value Date/Time     2023

## 2023-06-01 NOTE — H&P
History and Physical      CHIEF COMPLAINT: Left hand numbness and tingling    HISTORY OF PRESENT ILLNESS:      Chronic in nature and worsening    Past Medical History:        Diagnosis Date    Hyperlipidemia     Hypertension      Past Surgical History:        Procedure Laterality Date    ARM SURGERY Right 9/8/2020    DISTAL BICEPS TENDON RUPTURE REPAIR-RIGHT performed by Angel Brown DO at [address]      bilateral    FRACTURE SURGERY      left knee     Social History:    TOBACCO:   reports that he quit smoking about 13 years ago. His smoking use included cigarettes. He has a 5.00 pack-year smoking history. His smokeless tobacco use includes chew. ETOH:   reports current alcohol use. DRUGS:   reports no history of drug use. Family History:       Problem Relation Age of Onset    Diabetes Mother     High Cholesterol Mother     Cancer Father 46        Colon cancer    Coronary Art Dis Father     Diabetes Father     High Cholesterol Father     High Blood Pressure Sister     Atrial Fibrillation Maternal Grandfather     Heart Attack Maternal Grandfather     Diabetes Paternal Grandmother     Cancer Paternal Grandmother      Medications Prior to Admission:  Medications Prior to Admission: gabapentin (NEURONTIN) 300 MG capsule, Take 1 capsule by mouth nightly for 180 days. Intended supply: 30 days (Patient not taking: Reported on 5/24/2023)  ibuprofen (ADVIL;MOTRIN) 800 MG tablet, Take 1 tablet by mouth every 8 hours as needed for Pain  lisinopril (PRINIVIL;ZESTRIL) 20 MG tablet, TAKE 1 TABLET BY MOUTH DAILY  hydroCHLOROthiazide (HYDRODIURIL) 25 MG tablet, TAKE 1 TABLET BY MOUTH DAILY  atorvastatin (LIPITOR) 10 MG tablet, TAKE 1 TABLET BY MOUTH EVERY NIGHT  Allergies:  Patient has no known allergies.     CONSTITUTIONAL:  negative for  chills and anorexia  HEENT:  negative for  tinnitus  RESPIRATORY:  negative for  dyspnea and cyanosis  CARDIOVASCULAR:  negative for  palpitations,

## 2023-06-02 DIAGNOSIS — I10 ESSENTIAL HYPERTENSION: ICD-10-CM

## 2023-06-02 DIAGNOSIS — G56.22 CUBITAL TUNNEL SYNDROME, LEFT: ICD-10-CM

## 2023-06-02 DIAGNOSIS — S46.211A RUPTURE OF DISTAL BICEPS TENDON, RIGHT, INITIAL ENCOUNTER: Primary | ICD-10-CM

## 2023-06-02 DIAGNOSIS — E78.49 OTHER HYPERLIPIDEMIA: ICD-10-CM

## 2023-06-02 RX ORDER — ATORVASTATIN CALCIUM 10 MG/1
10 TABLET, FILM COATED ORAL NIGHTLY
Qty: 30 TABLET | Refills: 5 | Status: SHIPPED | OUTPATIENT
Start: 2023-06-02

## 2023-06-02 RX ORDER — LISINOPRIL 20 MG/1
20 TABLET ORAL DAILY
Qty: 30 TABLET | Refills: 1 | Status: SHIPPED | OUTPATIENT
Start: 2023-06-02

## 2023-06-02 RX ORDER — LISINOPRIL 20 MG/1
20 TABLET ORAL DAILY
Qty: 30 TABLET | Refills: 1 | OUTPATIENT
Start: 2023-06-02

## 2023-07-05 ENCOUNTER — OFFICE VISIT (OUTPATIENT)
Dept: ORTHOPEDIC SURGERY | Age: 38
End: 2023-07-05

## 2023-07-05 VITALS — WEIGHT: 315 LBS | TEMPERATURE: 98 F | HEIGHT: 67 IN | BODY MASS INDEX: 49.44 KG/M2

## 2023-07-05 DIAGNOSIS — G56.22 CUBITAL TUNNEL SYNDROME, LEFT: Primary | ICD-10-CM

## 2023-07-05 PROCEDURE — 99024 POSTOP FOLLOW-UP VISIT: CPT | Performed by: ORTHOPAEDIC SURGERY

## 2023-07-05 NOTE — PROGRESS NOTES
Chief Complaint:   Chief Complaint   Patient presents with    Elbow Injury     Left cubital tunnel release DOS 6/1/23. Would like to be released to full duty today for work. Doing well and not really having any problems. Deborah Palma 34 days postop left ulnar nerve decompression at the elbow. He has been taking it easy and been on a 10 pound work restriction. He is doing well and has noticed tremendous improvement in this sensation and comfort in his left arm. Allergies; medications; past medical, surgical, family, and social history; and problem list have been reviewed today and updated as indicated in this encounter seen below. Exam: The incision is healing well. There is slight prominence of the scar with no signs of complications. Range of motion of the elbow is good with no discomfort. Radiographs: None    ASSESSMENT:    Brunilda Brown was seen today for elbow injury. Diagnoses and all orders for this visit:    Cubital tunnel syndrome, left        PLAN: Return to work at regular duty and follow-up as needed. Return if symptoms worsen or fail to improve. Current Outpatient Medications   Medication Sig Dispense Refill    atorvastatin (LIPITOR) 10 MG tablet Take 1 tablet by mouth nightly 30 tablet 5    lisinopril (PRINIVIL;ZESTRIL) 20 MG tablet Take 1 tablet by mouth daily 30 tablet 1    gabapentin (NEURONTIN) 300 MG capsule Take 1 capsule by mouth nightly for 180 days. Intended supply: 30 days (Patient not taking: Reported on 5/24/2023) 90 capsule 0    ibuprofen (ADVIL;MOTRIN) 800 MG tablet Take 1 tablet by mouth every 8 hours as needed for Pain 90 tablet 2    hydroCHLOROthiazide (HYDRODIURIL) 25 MG tablet TAKE 1 TABLET BY MOUTH DAILY 90 tablet 3     No current facility-administered medications for this visit.        Patient Active Problem List   Diagnosis    Tobacco abuse    Essential hypertension    Skin tags, multiple acquired    MADDIE (obstructive sleep apnea)    Rupture of

## 2023-08-27 DIAGNOSIS — I10 ESSENTIAL HYPERTENSION: ICD-10-CM

## 2023-08-28 RX ORDER — LISINOPRIL 20 MG/1
20 TABLET ORAL DAILY
Qty: 90 TABLET | Refills: 1 | Status: SHIPPED | OUTPATIENT
Start: 2023-08-28

## 2023-08-28 NOTE — TELEPHONE ENCOUNTER
Last Appointment   5/25/2023  Next Appointment  Visit date not found    [employer] message sent to patient to schedule routine follow up.

## 2023-09-08 DIAGNOSIS — I10 ESSENTIAL HYPERTENSION: ICD-10-CM

## 2023-09-11 RX ORDER — HYDROCHLOROTHIAZIDE 25 MG/1
25 TABLET ORAL DAILY
Qty: 90 TABLET | Refills: 0 | Status: SHIPPED | OUTPATIENT
Start: 2023-09-11

## 2023-09-11 NOTE — TELEPHONE ENCOUNTER
Last Appointment   5/25/2023  Next Appointment  Visit date not found    [employer] message sent to patient to schedule routine follow up.

## 2024-02-11 DIAGNOSIS — I10 ESSENTIAL HYPERTENSION: ICD-10-CM

## 2024-02-11 DIAGNOSIS — S46.912A STRAIN OF LEFT SHOULDER, INITIAL ENCOUNTER: ICD-10-CM

## 2024-02-12 RX ORDER — HYDROCHLOROTHIAZIDE 25 MG/1
25 TABLET ORAL DAILY
Qty: 90 TABLET | Refills: 0 | OUTPATIENT
Start: 2024-02-12

## 2024-02-12 RX ORDER — IBUPROFEN 800 MG/1
800 TABLET ORAL EVERY 8 HOURS PRN
Qty: 90 TABLET | Refills: 2 | OUTPATIENT
Start: 2024-02-12

## 2024-10-27 ENCOUNTER — APPOINTMENT (OUTPATIENT)
Dept: GENERAL RADIOLOGY | Age: 39
End: 2024-10-27
Payer: COMMERCIAL

## 2024-10-27 ENCOUNTER — HOSPITAL ENCOUNTER (EMERGENCY)
Age: 39
Discharge: HOME | End: 2024-10-27
Payer: COMMERCIAL

## 2024-10-27 VITALS
HEART RATE: 108 BPM | DIASTOLIC BLOOD PRESSURE: 74 MMHG | OXYGEN SATURATION: 96 % | TEMPERATURE: 98.6 F | RESPIRATION RATE: 18 BRPM | SYSTOLIC BLOOD PRESSURE: 125 MMHG

## 2024-10-27 DIAGNOSIS — J06.9 VIRAL URI WITH COUGH: Primary | ICD-10-CM

## 2024-10-27 PROCEDURE — 71046 X-RAY EXAM CHEST 2 VIEWS: CPT

## 2024-10-27 PROCEDURE — 99211 OFF/OP EST MAY X REQ PHY/QHP: CPT

## 2024-10-27 PROCEDURE — 6360000002 HC RX W HCPCS: Performed by: NURSE PRACTITIONER

## 2024-10-27 PROCEDURE — 96372 THER/PROPH/DIAG INJ SC/IM: CPT

## 2024-10-27 RX ORDER — METHYLPREDNISOLONE 4 MG/1
TABLET ORAL
Qty: 1 KIT | Refills: 0 | Status: SHIPPED | OUTPATIENT
Start: 2024-10-27 | End: 2024-11-02

## 2024-10-27 RX ORDER — BROMPHENIRAMINE MALEATE, PSEUDOEPHEDRINE HYDROCHLORIDE, AND DEXTROMETHORPHAN HYDROBROMIDE 2; 30; 10 MG/5ML; MG/5ML; MG/5ML
5 SYRUP ORAL 4 TIMES DAILY PRN
Qty: 120 ML | Refills: 0 | Status: SHIPPED | OUTPATIENT
Start: 2024-10-27 | End: 2024-11-01

## 2024-10-27 RX ORDER — TRIAMCINOLONE ACETONIDE 40 MG/ML
40 INJECTION, SUSPENSION INTRA-ARTICULAR; INTRAMUSCULAR ONCE
Status: COMPLETED | OUTPATIENT
Start: 2024-10-27 | End: 2024-10-27

## 2024-10-27 RX ADMIN — TRIAMCINOLONE ACETONIDE 40 MG: 40 INJECTION, SUSPENSION INTRA-ARTICULAR; INTRAMUSCULAR at 17:42

## 2024-10-27 NOTE — ED PROVIDER NOTES
[facility]  ED  Encounter Note  Admit Date/RoomTime: 10/27/2024  4:49 PM  ED Room: 07/07     Chief Complaint:   Cough (Congestion  hoarse  sore throat chest pain   when coughs  headache   )    History of Present Illness   Source of history provided by:  patient.    Peter Palma is a 39 y.o. old male who presents to walk-in for nasal congestion, which began several day(s) prior to arrival. The symptoms are associated with chest congestion, sore throat and sinus pressure.  There has been NO fever, chills, N/V/D, chest pain or SOB. Denies any hx of asthma, COPD, or tobacco use. Has been taking nothing for the symptoms. There has been sick contacts with pneumonia.     Review of Systems   Unless otherwise stated in this report or unable to obtain because of the patient's clinical or mental status as evidenced by the medical record, this patients's positive and negative responses for Review of Systems, constitutional, psych, eyes, ENT, cardiovascular, respiratory, gastrointestinal, neurological, genitourinary, musculoskeletal, integument systems and systems related to the presenting problem are either stated in the preceding or were not pertinent or were negative for the symptoms and/or complaints related to the medical problem.    Past Medical History:  has a past medical history of Hyperlipidemia and Hypertension.   Past Surgical History:  has a past surgical history that includes fracture surgery; Carpal tunnel release; Arm Surgery (Right, 9/8/2020); and Arm Surgery (Left, 6/1/2023).  Social History:  reports that he quit smoking about 14 years ago. His smoking use included cigarettes. He started smoking about 24 years ago. He has a 5 pack-year smoking history. His smokeless tobacco use includes chew. He reports current alcohol use. He reports that he does not use drugs.  Family History: family history includes Atrial Fibrillation in his maternal grandfather; Cancer in his paternal

## 2025-01-29 DIAGNOSIS — E78.49 OTHER HYPERLIPIDEMIA: ICD-10-CM

## 2025-01-29 DIAGNOSIS — S46.912A STRAIN OF LEFT SHOULDER, INITIAL ENCOUNTER: ICD-10-CM

## 2025-01-29 DIAGNOSIS — I10 ESSENTIAL HYPERTENSION: ICD-10-CM

## 2025-01-30 DIAGNOSIS — E78.49 OTHER HYPERLIPIDEMIA: ICD-10-CM

## 2025-01-30 RX ORDER — ATORVASTATIN CALCIUM 10 MG/1
10 TABLET, FILM COATED ORAL NIGHTLY
Qty: 30 TABLET | Refills: 5 | Status: SHIPPED
Start: 2025-01-30 | End: 2025-01-31

## 2025-01-30 RX ORDER — IBUPROFEN 800 MG/1
800 TABLET, FILM COATED ORAL EVERY 8 HOURS PRN
Qty: 90 TABLET | Refills: 2 | Status: SHIPPED | OUTPATIENT
Start: 2025-01-30

## 2025-01-30 RX ORDER — LISINOPRIL 20 MG/1
20 TABLET ORAL DAILY
Qty: 90 TABLET | Refills: 1 | Status: SHIPPED | OUTPATIENT
Start: 2025-01-30

## 2025-01-30 RX ORDER — HYDROCHLOROTHIAZIDE 25 MG/1
25 TABLET ORAL DAILY
Qty: 90 TABLET | Refills: 0 | Status: SHIPPED | OUTPATIENT
Start: 2025-01-30

## 2025-01-31 RX ORDER — ATORVASTATIN CALCIUM 10 MG/1
10 TABLET, FILM COATED ORAL NIGHTLY
Qty: 30 TABLET | Refills: 0 | Status: SHIPPED | OUTPATIENT
Start: 2025-01-31

## 2025-01-31 NOTE — TELEPHONE ENCOUNTER
Name of Medication(s) Requested:  Requested Prescriptions     Pending Prescriptions Disp Refills    atorvastatin (LIPITOR) 10 MG tablet [Pharmacy Med Name: ATORVASTATIN 10MG TABLETS] 30 tablet 0     Sig: TAKE 1 TABLET BY MOUTH EVERY NIGHT       Medication is on current medication list Yes    Dosage and directions were verified? Yes    Quantity verified: 30 day supply     Pharmacy Verified?  Yes    Last Appointment:  5/25/2023    Future appts:  Future Appointments   Date Time Provider Department Center   2/18/2025 11:00 AM Christian Joyner DO [facility] DEP        (If no appt send self scheduling link. .REFILLAPPT)  Scheduling request sent?     [] Yes  [x] No    Does patient need updated?  [] Yes  [x] No

## 2025-03-27 ENCOUNTER — HOSPITAL ENCOUNTER (EMERGENCY)
Age: 40
Discharge: HOME | End: 2025-03-27
Payer: COMMERCIAL

## 2025-03-27 ENCOUNTER — APPOINTMENT (OUTPATIENT)
Dept: GENERAL RADIOLOGY | Age: 40
End: 2025-03-27
Payer: COMMERCIAL

## 2025-03-27 VITALS
HEART RATE: 97 BPM | OXYGEN SATURATION: 95 % | RESPIRATION RATE: 18 BRPM | SYSTOLIC BLOOD PRESSURE: 113 MMHG | TEMPERATURE: 98.1 F | DIASTOLIC BLOOD PRESSURE: 77 MMHG

## 2025-03-27 DIAGNOSIS — S46.912A SHOULDER STRAIN, LEFT, INITIAL ENCOUNTER: Primary | ICD-10-CM

## 2025-03-27 DIAGNOSIS — S46.212A STRAIN OF LEFT BICEPS MUSCLE, INITIAL ENCOUNTER: ICD-10-CM

## 2025-03-27 PROCEDURE — 99211 OFF/OP EST MAY X REQ PHY/QHP: CPT

## 2025-03-27 PROCEDURE — 6370000000 HC RX 637 (ALT 250 FOR IP): Performed by: NURSE PRACTITIONER

## 2025-03-27 PROCEDURE — 73030 X-RAY EXAM OF SHOULDER: CPT

## 2025-03-27 RX ORDER — NAPROXEN 500 MG/1
500 TABLET ORAL 2 TIMES DAILY
Qty: 14 TABLET | Refills: 0 | Status: SHIPPED | OUTPATIENT
Start: 2025-03-27 | End: 2025-04-03

## 2025-03-27 RX ORDER — NAPROXEN 250 MG/1
500 TABLET ORAL ONCE
Status: COMPLETED | OUTPATIENT
Start: 2025-03-27 | End: 2025-03-27

## 2025-03-27 RX ADMIN — NAPROXEN 500 MG: 250 TABLET ORAL at 18:56

## 2025-03-27 ASSESSMENT — PAIN - FUNCTIONAL ASSESSMENT: PAIN_FUNCTIONAL_ASSESSMENT: 0-10

## 2025-03-27 ASSESSMENT — PAIN SCALES - GENERAL: PAINLEVEL_OUTOF10: 6

## 2025-03-28 NOTE — ED PROVIDER NOTES
Department of Emergency Medicine   ED  Encounter Note  Admit Date/RoomTime: 3/27/2025  6:03 PM  ED Room:     NAME: Peter Palma  : 1985  MRN: [id number]     Chief Complaint:  Shoulder Injury (Hurt his arm at work to day    pulling on a chain and heard something pop in his shoulder  left )    History of Present Illness       Peter Palma is a 39 y.o. old male who presents to urgent careby private vehicle, for traumatic Left shoulder and upper arm pain pain which occured several hour(s) prior to arrival.  The complaint is due to was pulling a chain at work and felt a pop.  History of left shoulder strain previously.  He is right handed.   Since onset the symptoms have been stable.  His pain is aggraveated by any movement and relieved by nothing, as no treatment has been provided prior to this visit. He denies any nausea the ground or any other injury.    ROS   Pertinent positives and negatives are stated within HPI, all other systems reviewed and are negative.    Past Medical History:  has a past medical history of Hyperlipidemia and Hypertension.    Surgical History:  has a past surgical history that includes fracture surgery; Carpal tunnel release; Arm Surgery (Right, 2020); and Arm Surgery (Left, 2023).    Social History:  reports that he quit smoking about 15 years ago. His smoking use included cigarettes. He started smoking about 25 years ago. He has a 5 pack-year smoking history. His smokeless tobacco use includes chew. He reports current alcohol use. He reports that he does not use drugs.    Family History: family history includes Atrial Fibrillation in his maternal grandfather; Cancer in his paternal grandmother; Cancer (age of onset: 51) in his father; Coronary Art Dis in his father; Diabetes in his father, mother, and paternal grandmother; Heart Attack in his maternal grandfather; High Blood Pressure in his sister; High Cholesterol in his father and mother.     Allergies:

## 2025-04-07 NOTE — TELEPHONE ENCOUNTER
Last Appointment   3/3/2021  Next Appointment  5/4/2022
Needs labs done asap. Please place order for cbc, cmp, lipid panel under diagnosis hypertension.
no

## 2025-04-09 ENCOUNTER — EVALUATION (OUTPATIENT)
Dept: PHYSICAL THERAPY | Age: 40
End: 2025-04-09
Payer: COMMERCIAL

## 2025-04-09 DIAGNOSIS — S46.212A TRAUMATIC PARTIAL TEAR OF LEFT BICEPS TENDON, INITIAL ENCOUNTER: ICD-10-CM

## 2025-04-09 DIAGNOSIS — S46.912A STRAIN OF LEFT SHOULDER, INITIAL ENCOUNTER: Primary | ICD-10-CM

## 2025-04-09 PROCEDURE — 97110 THERAPEUTIC EXERCISES: CPT | Performed by: PHYSICAL THERAPIST

## 2025-04-09 PROCEDURE — 97162 PT EVAL MOD COMPLEX 30 MIN: CPT | Performed by: PHYSICAL THERAPIST

## 2025-04-09 NOTE — PROGRESS NOTES
Physical Therapy Daily Treatment Note    Date: 2025  Patient Name: Peter Palma  : 1985   MRN: [id number]  DOInjury: 3/27/2025  DOSx: --   Referring Provider: Gregorio Beyer PA-C  [address] [place] [address]     Medical Diagnosis:      Diagnosis Orders   1. Strain of left shoulder, initial encounter        2. Traumatic partial tear of left biceps tendon, initial encounter          Charlie appears to have  biceps and rotator cuff strain .  Patient is limited in all directions along with impaired strength, function, weightbearing tolerance.  Treatment will consist of AAROM, PROM, AROM, stretching, strengthening, and home program development.  Shoulder do's and don'ts discussed.  Tips to improve sleep comfort provided.      Access Code: [email or web address]  URL: [email or web address]  Date: 2025  Prepared by: Aj Street    Program Notes  Shoulder tips:-- When looking straight ahead, keep your hand where you can see it. -- Keep arm close to body-- Keep loads light-- Avoid reaching across your body-- Avoid turning steering wheel with arm over top of the wheel-- Avoid reaching back into the back seat to retrieve items    Exercises  - Supine Shoulder Press  - 2 x daily - 2-3 sets - 10 reps  - Supine Shoulder External Rotation with Dowel  - 2 x daily - 2-3 sets - 10 reps  - Supine Shoulder Flexion with Dowel  - 2 x daily - 2-3 sets - 10 reps    X = TO BE PERFORMED NEXT VISIT  > = PROGRESS TO THIS FIRST  >> = PROGRESS TO THIS SECOND  >>> = PROGRESS TO THIS THIRD    S: See tomas  O:    Time 3823-3697     Visit     [facility] Claim [id number]  Dates 3/31/25 -- 2025  12 visits Repeat outcome measure at mid point and end.    Pain Pain 3-10/10     ROM Right Shoulder:  AROM: 150° Forward elevation,  60° ER,  IR to PSIS  PROM: 150° Forward elevation,  75° ER,  45° IR     Left Shoulder:  AROM: 135° Forward elevation,  40° ER,  IR to Lateral hip  PROM: 135° Forward elevation,  40° ER , 30° IR

## 2025-04-09 NOTE — PROGRESS NOTES
[facility] OUTPATIENT REHABILITATION  PHYSICAL THERAPY INITIAL EVALUATION         Date:  2025   Patient: Peter Palma  : 1985  MRN: [id number]  Referring Provider: Gregorio Beyer PA-C  [address] [place] [address]     Medical Diagnosis:      Diagnosis Orders   1. Strain of left shoulder, initial encounter        2. Traumatic partial tear of left biceps tendon, initial encounter            Physician Order: Eval and Treat   [facility] Claim [id number]  Dates 3/31/25 -- 2025  12 visits     SUBJECTIVE:     Onset date: 3/27/2025    Onset: Sudden     History / Mechanism of Injury: Pulling on winch cable attached to roll-back truck.  Patient is right handed.    Interventions for current problem:  naproxen x 7 days    Chief complaint: pain, decreased mobility, limited ability to complete ADLs (dressing , bathing, grooming), limited ability to complete IADLs (cooking, cleaning, laundry), limited ability to lift/carry/handle material, limited ability to complete home/outdoor chores/tasks, poor sleep quality     Behavior: condition is staying the same    Pain:   Current: 5/10     Best: 3/10     Worst:10/10.  Pain returns to baseline in an hour, a few hours  Pain frequency: constant, waxing and waning    Symptom Type / Quality: shooting  Location:: anterior, posterior        Aggravated by: reaching overhead, reaching out, reaching behind back, lifting/carrying/material handling    Relieved by: rest, ice      Imaging results: XR SHOULDER LEFT (MIN 2 VIEWS)  Result Date: 3/27/2025  EXAMINATION: THREE XRAY VIEWS OF THE LEFT SHOULDER 3/27/2025 6:14 pm COMPARISON: None. HISTORY: ORDERING SYSTEM PROVIDED HISTORY: PULLING INJURY TECHNOLOGIST PROVIDED HISTORY: Reason for exam:->PULLING INJURY FINDINGS: Glenohumeral joint is normally aligned.  No evidence of acute fracture or dislocation.  No abnormal periarticular calcifications.  Mild AC joint arthropathy. Visualized lung is

## 2025-04-15 ENCOUNTER — TREATMENT (OUTPATIENT)
Dept: PHYSICAL THERAPY | Age: 40
End: 2025-04-15
Payer: COMMERCIAL

## 2025-04-15 DIAGNOSIS — S46.912A STRAIN OF LEFT SHOULDER, INITIAL ENCOUNTER: Primary | ICD-10-CM

## 2025-04-15 PROCEDURE — 97110 THERAPEUTIC EXERCISES: CPT

## 2025-04-15 NOTE — PROGRESS NOTES
Physical Therapy Daily Treatment Note    Date: 4/15/2025  Patient Name: Peter Palma  : 1985   MRN: [id number]  DOInjury: 3/27/2025  DOSx: --   Referring Provider: Gregorio Beyer PA-C  [address] [place], [place] [id number]     Medical Diagnosis:      Diagnosis Orders   1. Strain of left shoulder, initial encounter          Charlie appears to have  biceps and rotator cuff strain .  Patient is limited in all directions along with impaired strength, function, weightbearing tolerance.  Treatment will consist of AAROM, PROM, AROM, stretching, strengthening, and home program development.  Shoulder do's and don'ts discussed.  Tips to improve sleep comfort provided.      Access Code: [email or web address]  URL: [email or web address]  Date: 2025  Prepared by: Aj Street    Program Notes  Shoulder tips:-- When looking straight ahead, keep your hand where you can see it. -- Keep arm close to body-- Keep loads light-- Avoid reaching across your body-- Avoid turning steering wheel with arm over top of the wheel-- Avoid reaching back into the back seat to retrieve items    Exercises  - Supine Shoulder Press  - 2 x daily - 2-3 sets - 10 reps  - Supine Shoulder External Rotation with Dowel  - 2 x daily - 2-3 sets - 10 reps  - Supine Shoulder Flexion with Dowel  - 2 x daily - 2-3 sets - 10 reps    X = TO BE PERFORMED NEXT VISIT  > = PROGRESS TO THIS FIRST  >> = PROGRESS TO THIS SECOND  >>> = PROGRESS TO THIS THIRD    S: pt reports feeling pretty good today .  O:    Time 6197-8206 am     Visit     [facility] Claim [id number]  Dates 3/31/25 -- 2025  12 visits Repeat outcome measure at mid point and end.    Pain Pain 3-1010     ROM Right Shoulder:  AROM: 150° Forward elevation,  60° ER,  IR to PSIS  PROM: 150° Forward elevation,  75° ER,  45° IR     Left Shoulder:  AROM: 135° Forward elevation,  40° ER,  IR to Lateral hip  PROM: 135° Forward elevation,  40° ER , 30° IR     Modalities      Heat / Ice + ES prn  MO   Manual

## 2025-04-18 ENCOUNTER — TREATMENT (OUTPATIENT)
Dept: PHYSICAL THERAPY | Age: 40
End: 2025-04-18

## 2025-04-18 DIAGNOSIS — S46.912A STRAIN OF LEFT SHOULDER, INITIAL ENCOUNTER: Primary | ICD-10-CM

## 2025-04-18 NOTE — PROGRESS NOTES
Physical Therapy Daily Treatment Note    Date: 2025  Patient Name: Peter Palma    \" call him Charlie \"   : 1985   MRN: [id number]  DOInjury: 3/27/2025  DOSx: --   Referring Provider: Gregorio Beeyr PA-C  [address] [place] [address]     Medical Diagnosis:      Diagnosis Orders   1. Strain of left shoulder, initial encounter          Charlie appears to have  biceps and rotator cuff strain .  Patient is limited in all directions along with impaired strength, function, weightbearing tolerance.  Treatment will consist of AAROM, PROM, AROM, stretching, strengthening, and home program development.  Shoulder do's and don'ts discussed.  Tips to improve sleep comfort provided.      Access Code: [email or web address]  URL: [email or web address]  Date: 2025  Prepared by: Aj Street    Program Notes  Shoulder tips:-- When looking straight ahead, keep your hand where you can see it. -- Keep arm close to body-- Keep loads light-- Avoid reaching across your body-- Avoid turning steering wheel with arm over top of the wheel-- Avoid reaching back into the back seat to retrieve items    Exercises  - Supine Shoulder Press  - 2 x daily - 2-3 sets - 10 reps  - Supine Shoulder External Rotation with Dowel  - 2 x daily - 2-3 sets - 10 reps  - Supine Shoulder Flexion with Dowel  - 2 x daily - 2-3 sets - 10 reps    X = TO BE PERFORMED NEXT VISIT  > = PROGRESS TO THIS FIRST  >> = PROGRESS TO THIS SECOND  >>> = PROGRESS TO THIS THIRD    S: pt reports feeling pretty good today .  O:    Time 1087-2774 am     Visit 3 /12    [facility] Claim [id number]  Dates 3/31/25 -- 2025  12 visits Repeat outcome measure at mid point and end.    Pain Pain 2-3/10     ROM Right Shoulder:  AROM: 150° Forward elevation,  60° ER,  IR to PSIS  PROM: 150° Forward elevation,  75° ER,  45° IR     Left Shoulder:  AROM: 135° Forward elevation,  40° ER,  IR to Lateral hip  PROM: 135° Forward elevation,  40° ER , 30° IR     Modalities      Heat / Ice

## 2025-04-23 ENCOUNTER — TELEPHONE (OUTPATIENT)
Dept: PHYSICAL THERAPY | Age: 40
End: 2025-04-23

## 2025-04-23 NOTE — TELEPHONE ENCOUNTER
Pt cancelled treatment session due to reporting not feeling well.   Pt scheduled for future appointments .

## 2025-04-25 ENCOUNTER — TREATMENT (OUTPATIENT)
Dept: PHYSICAL THERAPY | Age: 40
End: 2025-04-25

## 2025-04-25 DIAGNOSIS — S46.912A STRAIN OF LEFT SHOULDER, INITIAL ENCOUNTER: Primary | ICD-10-CM

## 2025-04-25 NOTE — PROGRESS NOTES
reps  - Shoulder External Rotation with Anchored Resistance  - 2 x daily - 5 x weekly - 3 sets - 10 reps  - Row anchor point high  - 2 x daily - 5 x weekly - 3 sets - 10 reps    S: pt reports feeling pretty good today .  O:    Time 1137-1483 am     Visit 3 /12    [facility] Claim [id number]  Dates 3/31/25 -- 5/12/2025  12 visits Repeat outcome measure at mid point and end.    Pain Pain 2-3/10     ROM Right Shoulder:  AROM: 150° Forward elevation,  60° ER,  IR to PSIS  PROM: 150° Forward elevation,  75° ER,  45° IR     Left Shoulder:  AROM: 135° Forward elevation,  40° ER,  IR to Lateral hip  PROM: 135° Forward elevation,  40° ER , 30° IR     Modalities      Heat / Ice + ES prn  MO   Manual      Stretching all directions Monitor.  Add as needed.   MT         Stretch      Table slides   TE   Wall Flexion Monitor.  Add as needed.  TE   Wall ER stretch Monitor.  Add as needed.  TE   IR stretch behind back Monitor.  Add as needed.  TE      TE   Exercise      Pulleys - flex X 4 min   TE   Pulleys-IR   TE     Pt given copies of all ex's     Supine wand chest press Black wand 3 x 10  TE   Supine wand flex Black wand 3 x 10  TE   Supine wand ER/IR (goal post) Black wand 3 x 10  TE   Standing wand behind back IR Black wand 3 X 10    TE   Supine flexion 3# 3 X 10   TE   Standing wand flex Black wand 3 X 10   TE   Standing flexion 3 X 10   TE   ROWS: H Blue 3 x 10 new   TA   ROWS: M Blue 3 x 10   TA   ROWS: L Blue 3 x 10   TA   ER (towel roll under arm) >  TE   IR (towel roll under arm) Green 3 x 10   TE   Flexion in scapular plane >     Shelf Lift >     Biceps curl >     Triceps press down Green 3 x 10 new                  A:  Tolerated well.  Discussed anatomy, physiology, body mechanics, principles of loading, and progressive loading/activity.  Reviewed home exercise program extensively; written copy provided.    P: Continue with rehab plan . Give pt blue / green t tubing next visit on 4/23/2025 .  Ata Escalona, PTA    Treatment

## (undated) DEVICE — DOUBLE BASIN SET: Brand: MEDLINE INDUSTRIES, INC.

## (undated) DEVICE — READY WET SKIN SCRUB TRAY-LF: Brand: MEDLINE INDUSTRIES, INC.

## (undated) DEVICE — ELECTRODE PT RET AD L9FT HI MOIST COND ADH HYDRGEL CORDED

## (undated) DEVICE — SOLUTION IRRIG 1000ML 0.9% SOD CHL USP POUR PLAS BTL

## (undated) DEVICE — CLOTH SURG PREP PREOPERATIVE CHLORHEXIDINE GLUC 2% READYPREP

## (undated) DEVICE — SPONGE,LAP,12"X12",XR,ST,5/PK,40PK/CS: Brand: MEDLINE

## (undated) DEVICE — MARKER,SKIN,WI/RULER AND LABELS: Brand: MEDLINE

## (undated) DEVICE — HAND SET

## (undated) DEVICE — NDL CNTR 40CT FM MAG: Brand: MEDLINE INDUSTRIES, INC.

## (undated) DEVICE — PACK,EXTREMITY,ORTHOMAX,5/CS: Brand: MEDLINE

## (undated) DEVICE — ELECTRODE NDL L2.8IN COAT LO PWR SET EDGE

## (undated) DEVICE — BANDAGE COMPR W3INXL5YD WHT BGE POLY COT M E WRP WV HK AND

## (undated) DEVICE — PENCIL ES L3M BTTN SWCH HOLSTER W/ BLDE ELECTRD EDGE

## (undated) DEVICE — BANDAGE,SELF ADHRNT,COFLEX,4"X5YD,STRL: Brand: COLABEL

## (undated) DEVICE — BANDAGE ELASTIC COMPRSS ESMRK 4.0INX3.0YD

## (undated) DEVICE — Z DISCONTINUED USE 2275686 GLOVE SURG SZ 8 L12IN FNGR THK13MIL WHT ISOLEX POLYISOPRENE

## (undated) DEVICE — SHEET DRAPE FULL 70X100

## (undated) DEVICE — GLOVE ORTHO 8   MSG9480

## (undated) DEVICE — STRIP,CLOSURE,WOUND,MEDI-STRIP,1/4X3: Brand: MEDLINE

## (undated) DEVICE — BANDAGE COMPR W6INXL12FT SMOOTH FOR LIMB EXSANG ESMARCH

## (undated) DEVICE — SYRINGE IRRIG 60ML SFT PLIABLE BLB EZ TO GRP 1 HND USE W/

## (undated) DEVICE — GAUZE,SPONGE,4"X4",16PLY,STRL,LF,10/TRAY: Brand: MEDLINE

## (undated) DEVICE — SHEET,DRAPE,40X58,STERILE: Brand: MEDLINE

## (undated) DEVICE — BAND FLX MSTR STD STR 6IN

## (undated) DEVICE — STRIP,CLOSURE,WOUND,MEDI-STRIP,1/2X4: Brand: MEDLINE

## (undated) DEVICE — SOLUTION IRRIG 1500ML 0.9% SOD CHL USP POUR PLAS BTL

## (undated) DEVICE — GOWN,SIRUS,FABRNF,XL,20/CS: Brand: MEDLINE

## (undated) DEVICE — SYRINGE MED 10ML LUERLOCK TIP W/O SFTY DISP

## (undated) DEVICE — BANDAGE,GAUZE,4.5"X4.1YD,STERILE,LF: Brand: MEDLINE

## (undated) DEVICE — COVER,LIGHT HANDLE,FLX,1/PK: Brand: MEDLINE INDUSTRIES, INC.

## (undated) DEVICE — INTENDED FOR TISSUE SEPARATION, AND OTHER PROCEDURES THAT REQUIRE A SHARP SURGICAL BLADE TO PUNCTURE OR CUT.: Brand: BARD-PARKER ® STAINLESS STEEL BLADES

## (undated) DEVICE — GOWN,SIRUS,POLYRNF,BRTHSLV,XLN/XL,20/CS: Brand: MEDLINE

## (undated) DEVICE — DRESSING GZ XRFRM 4X4(25/BX 6BX/CS)

## (undated) DEVICE — CORD ES L12FT BPLR FRCP

## (undated) DEVICE — BASIC DOUBLE BASIN 2-LF: Brand: MEDLINE INDUSTRIES, INC.

## (undated) DEVICE — BANDAGE,GAUZE,CONFORMING,3"X75",STRL,LF: Brand: MEDLINE

## (undated) DEVICE — 3M™ STERI-DRAPE™ U-DRAPE 1015: Brand: STERI-DRAPE™

## (undated) DEVICE — GAUZE,SPONGE,4"X4",16PLY,XRAY,STRL,LF: Brand: MEDLINE

## (undated) DEVICE — STOCKINETTE: Brand: CONVERTORS

## (undated) DEVICE — Z DISCONTINUED NO SUB IDED GLOVE SURG BEAD CUF 8 STD PF WHT STRL TRIUMPH LT LTX

## (undated) DEVICE — BLADE,STAINLESS-STEEL,10,STRL,DISPOSABLE: Brand: MEDLINE

## (undated) DEVICE — TUBING, SUCTION, 1/4" X 10', STRAIGHT: Brand: MEDLINE

## (undated) DEVICE — GLOVE ORANGE PI 8   MSG9080

## (undated) DEVICE — ELECTRODE ES L3IN S STL BLDE INSUL DISP VALLEYLAB EDGE

## (undated) DEVICE — DRESSING GZ W1XL8IN COT XRFRM N ADH OVERWRAP CURAD

## (undated) DEVICE — TOWEL,OR,DSP,ST,BLUE,STD,6/PK,12PK/CS: Brand: MEDLINE